# Patient Record
Sex: FEMALE | Race: WHITE | NOT HISPANIC OR LATINO | Employment: UNEMPLOYED | URBAN - METROPOLITAN AREA
[De-identification: names, ages, dates, MRNs, and addresses within clinical notes are randomized per-mention and may not be internally consistent; named-entity substitution may affect disease eponyms.]

---

## 2020-03-03 ENCOUNTER — OFFICE VISIT (OUTPATIENT)
Dept: FAMILY MEDICINE CLINIC | Facility: CLINIC | Age: 25
End: 2020-03-03

## 2020-03-03 VITALS
TEMPERATURE: 98 F | BODY MASS INDEX: 23.51 KG/M2 | OXYGEN SATURATION: 98 % | WEIGHT: 146.3 LBS | HEART RATE: 61 BPM | DIASTOLIC BLOOD PRESSURE: 78 MMHG | HEIGHT: 66 IN | SYSTOLIC BLOOD PRESSURE: 114 MMHG

## 2020-03-03 DIAGNOSIS — Z00.00 HEALTH MAINTENANCE EXAMINATION: Primary | ICD-10-CM

## 2020-03-03 PROBLEM — F41.1 GAD (GENERALIZED ANXIETY DISORDER): Status: ACTIVE | Noted: 2020-03-03

## 2020-03-03 PROBLEM — E55.9 HYPOVITAMINOSIS D: Status: ACTIVE | Noted: 2020-03-03

## 2020-03-03 PROCEDURE — 99385 PREV VISIT NEW AGE 18-39: CPT | Performed by: FAMILY MEDICINE

## 2020-03-03 NOTE — PROGRESS NOTES
Marie Sheridan is a 24 y.o. female.     Chief Complaint   Patient presents with   • Establish Care     new pt establishing today with dr venegas   • Anxiety     pt has history anxietry and depression is fine right now but would like to make sure dr venegas knows       HPI     Pt is a pleasant 24 y.o. YO female here for Annual exam.  Also with hx of anxiety that started in college about 2012.  She thinks it was very circumstantial with her father living her mom.  Had tried medication for 1 year with minimal help with anxiety and not with depression, went to a psychiatrist that was not helpful.  She was on Paxil and Lithium at that time - she was having extreme panic attacks. No SI/HI.    Health Maintenance   Topic Date Due   • ANNUAL PHYSICAL  06/16/1997   • HPV VACCINES (1 - Female 2-dose series) 06/16/2005   • PAP SMEAR  03/03/2020   • INFLUENZA VACCINE  08/01/2020   • TDAP/TD VACCINES (2 - Td) 03/12/2029   • HEPATITIS C SCREENING  Completed       Diet: exercising 3 times a week, trim healthy mama diet     GYN: UTD  Immunizations: UTD      The following portions of the patient's history were reviewed and updated as appropriate: allergies, current medications, past family history, past medical history, past social history, past surgical history and problem list.    Review of Systems   Constitutional: Negative.  Negative for fatigue.   HENT: Negative.    Eyes: Negative.  Negative for discharge and itching.   Respiratory: Negative.  Negative for apnea, choking and chest tightness.    Cardiovascular: Negative for chest pain, palpitations and leg swelling.   Gastrointestinal: Negative.  Negative for abdominal distention, abdominal pain and anal bleeding.   Endocrine: Negative.  Negative for cold intolerance and heat intolerance.   Genitourinary: Negative.  Negative for difficulty urinating, dyspareunia and dysuria.   Musculoskeletal: Negative.  Negative for arthralgias, back pain, gait problem, joint swelling and myalgias.    Skin: Negative.  Negative for color change and pallor.   Allergic/Immunologic: Negative for environmental allergies, food allergies and immunocompromised state.   Neurological: Positive for dizziness. Negative for facial asymmetry.   Hematological: Negative.    Psychiatric/Behavioral: Negative.  Negative for suicidal ideas.       Objective  Vitals:    03/03/20 1053   BP: 114/78   Pulse: 61   Temp: 98 °F (36.7 °C)   SpO2: 98%        Physical Exam   Constitutional: She is oriented to person, place, and time. She appears well-developed and well-nourished. No distress.   HENT:   Head: Normocephalic.   Nose: Nose normal.   Eyes: EOM are normal.   Cardiovascular: Normal rate, regular rhythm, normal heart sounds and intact distal pulses.   No murmur heard.  Pulmonary/Chest: Effort normal and breath sounds normal. No respiratory distress.   Musculoskeletal: Normal range of motion.   Neurological: She is alert and oriented to person, place, and time.   Skin: Skin is warm and dry. No rash noted.   Psychiatric: She has a normal mood and affect. Her behavior is normal. Judgment and thought content normal.   Nursing note and vitals reviewed.      No current outpatient medications on file.    Procedures    Lab Results (most recent)     None              Marie was seen today for establish care and anxiety.    Diagnoses and all orders for this visit:    Health maintenance examination      Pleasant 25 YO pt here as a new pt, HM performed today.  TONY well controlled off meds currently. Hx of severe depression on lithium and paxil previously managed by psych - but doing well currently without issues.  Good support system/ healthy lifestyle and proactive. No current symptoms.   UTD.  GYN health UTD.  Counseled patient to heat healthy well balanced meals and exercise regularly.     Return in about 1 year (around 3/3/2021), or if symptoms worsen or fail to improve, for Annual physical.      Hazel Quijano MD

## 2020-04-10 ENCOUNTER — PATIENT MESSAGE (OUTPATIENT)
Dept: FAMILY MEDICINE CLINIC | Facility: CLINIC | Age: 25
End: 2020-04-10

## 2020-04-15 NOTE — TELEPHONE ENCOUNTER
"From: Marie Sheridan  To: Hazel Quijano MD  Sent: 4/10/2020 11:57 AM EDT  Subject: Visit Follow-Up Question    Hazel Tim,    I had a question about my appointment. I was under the impression that my appointment was an annual exam that was preventative. I am being charged for a diagnoses appointment. I believe all we talked about was my medical history and nothing that is current. I thought that was included as being an established patient. I am being charged for something that isn't considered \"preventative\" and I was wondering if you could change that for me since I was just getting established and my annual appointment. Please let me know if I misunderstood my appointment. Thank you so much!    Marie Sheridan  "

## 2020-04-24 ENCOUNTER — E-VISIT (OUTPATIENT)
Dept: FAMILY MEDICINE CLINIC | Facility: CLINIC | Age: 25
End: 2020-04-24

## 2020-04-24 DIAGNOSIS — J06.9 ACUTE URI: Primary | ICD-10-CM

## 2020-04-29 NOTE — PATIENT INSTRUCTIONS
Latasha Ms Sheridan,    It seems like there has been some confusion with the Evisits, so sorry for that.  I have been out of the office and returned today. I do think that your symptoms warrant testing for COVID 19.  If the test is negative it alternatively could be bad allergies or a sinus infection.  If the test is negative I would wait for over a week of symptoms to see if your symptoms improve.  If they do then no need for more treatment.  If they do not improve then I would recommend following up me via a phone call to get some Augmentin.     In terms of testing: The below link has the testing for COVID19 drive through clinic in [facility].  Currently we have one drive through center.  We are hoping to have supplies to test as well but as of now my office does not have swabs to test patients. I would recommend logging in early in the AM to make an apt for a COVID19 test.  I understand they fill up quickly as they are the only center for the [facility].  If you are not able to get a testing date I do not think you will be harmed, but supportive care at home and acting as if you are positive.  We hope to have antibody testing later on to see who has already developed immunity to the virus.  If you get shortness of breath, blue lips or fingers, get high fevers or rapidly worsening please go to the ER.     [email or web address]    Please let me know if you have more questions,    Dr. Quijano

## 2020-04-29 NOTE — PROGRESS NOTES
Marie Sheridan    1995  [id number]    I have reviewed the e-Visit questionnaire and patient's answers, my assessment and plan are as follows:    HPI  Pt with less than one week of cough, chest tightness, low grade temps, sinus pressure that is not improving. No SOA or exertional dyspnea.     Review of Systems - History obtained from chart review and the patient  Allergy and Immunology ROS: positive for - nasal congestion  Negative for SOA or high fever      Diagnoses and all orders for this visit:    Acute URI    start quarantine, instructions for drive through testing given.  Instructions to ER given, FU if not improving.     Any medications prescribed have been sent electronically to   [facility]/pharmacy #[id number] [place], [place] - [address] [place] [address] AT [facility] - [phone]  - [phone] [place] [address]  Phone: [phone] Fax: [phone]        Hazel Quijano MD  04/29/20  4:14 PM

## 2020-04-30 ENCOUNTER — TELEMEDICINE (OUTPATIENT)
Dept: FAMILY MEDICINE CLINIC | Facility: CLINIC | Age: 25
End: 2020-04-30

## 2020-04-30 VITALS — BODY MASS INDEX: 22.18 KG/M2 | WEIGHT: 138 LBS | HEIGHT: 66 IN

## 2020-04-30 DIAGNOSIS — J06.9 ACUTE URI: Primary | ICD-10-CM

## 2020-04-30 PROCEDURE — 99213 OFFICE O/P EST LOW 20 MIN: CPT | Performed by: FAMILY MEDICINE

## 2020-04-30 NOTE — PROGRESS NOTES
Marie Sheridan is a 24 y.o. female.     Chief Complaint   Patient presents with   • possible covid-19     Patient is wanting to discuss symptoms she is having        HPI     Pt is a pleasant 24 y.o. YO female here for URI symptoms - 6 days of cough, congestion, sinus pressure and low grade temps with some chest tightness, feels resolved and now no sx.  However her son is now having temps around 99 and cough with congestion.  she is concerned for her son with hypotonia.  Her symptoms have greatly improved and almost resolved, she is concerned for her sons safety and knowing if they have already been exposed or not.      The following portions of the patient's history were reviewed and updated as appropriate: allergies, current medications, past family history, past medical history, past social history, past surgical history and problem list.    Review of Systems   Constitutional: Positive for appetite change and fever.   HENT: Positive for congestion.    Eyes: Negative for discharge and itching.   Respiratory: Positive for cough and chest tightness.    Cardiovascular: Negative for chest pain and leg swelling.   Gastrointestinal: Negative for abdominal distention and abdominal pain.   Endocrine: Negative for cold intolerance and heat intolerance.   Genitourinary: Negative for difficulty urinating and dyspareunia.   Musculoskeletal: Negative for arthralgias and back pain.   Skin: Negative for color change and pallor.   Allergic/Immunologic: Negative for environmental allergies and food allergies.   Neurological: Positive for headaches.   Hematological: Negative for adenopathy. Does not bruise/bleed easily.   Psychiatric/Behavioral: Negative for agitation and behavioral problems.       Objective  There were no vitals filed for this visit.     Physical Exam   Constitutional: She appears well-developed and well-nourished. No distress.   Pulmonary/Chest: No respiratory distress.   Psychiatric: She has a normal mood and  affect. Her behavior is normal. Judgment and thought content normal.       No current outpatient medications on file.    Procedures    Lab Results (most recent)     None              Marie was seen today for possible covid-19.    Diagnoses and all orders for this visit:    Acute URI        We discussed antibody testing and will plan to have this done when available and reliable. I also advise that she call her sons pediatrician for testing.     Gets labs from [facility] ([place]     Return if symptoms worsen or fail to improve.      Hazel Quijano MD    This was an audio and video enabled telemedicine encounter secondary to CDC recommendations and patient safety with COVID19 Pandemic.

## 2021-01-14 ENCOUNTER — OFFICE VISIT (OUTPATIENT)
Dept: FAMILY MEDICINE CLINIC | Facility: CLINIC | Age: 26
End: 2021-01-14

## 2021-01-14 VITALS
WEIGHT: 138 LBS | HEART RATE: 66 BPM | HEIGHT: 66 IN | TEMPERATURE: 97.8 F | OXYGEN SATURATION: 100 % | DIASTOLIC BLOOD PRESSURE: 78 MMHG | BODY MASS INDEX: 22.18 KG/M2 | SYSTOLIC BLOOD PRESSURE: 108 MMHG

## 2021-01-14 DIAGNOSIS — H60.392 OTHER INFECTIVE ACUTE OTITIS EXTERNA OF LEFT EAR: Primary | ICD-10-CM

## 2021-01-14 PROCEDURE — 99213 OFFICE O/P EST LOW 20 MIN: CPT | Performed by: FAMILY MEDICINE

## 2021-01-14 NOTE — PROGRESS NOTES
"Chief Complaint  Earache (left ear pain for few days  )    Subjective          Marie Sheridan presents to [facility] for   History of Present Illness  L ear pain, mild congestion, dull ache, no hearing loss, no fever.  No swimming.  No drainage from the ear.  No sick contacts or other symptoms  Objective   Vital Signs:   /78   Pulse 66   Temp 97.8 °F (36.6 °C)   Ht 167.6 cm (65.98\")   Wt 62.6 kg (138 lb)   SpO2 100%   BMI 22.29 kg/m²     Physical Exam  Vitals signs and nursing note reviewed.   Constitutional:       General: She is not in acute distress.     Appearance: She is well-developed.   HENT:      Head: Normocephalic.      Comments: Ear exam with cerumen impaction on the right that is mild, left ear with normal TM, bony landmarks visualized and normal.  Some erythema within the ear canal.  No anterior lymphadenopathy or cervical lymphadenopathy.     Nose: Nose normal.   Cardiovascular:      Rate and Rhythm: Normal rate and regular rhythm.      Heart sounds: Normal heart sounds. No murmur.   Pulmonary:      Effort: Pulmonary effort is normal. No respiratory distress.      Breath sounds: Normal breath sounds.   Musculoskeletal: Normal range of motion.   Skin:     General: Skin is warm and dry.      Findings: No rash.   Neurological:      Mental Status: She is alert and oriented to person, place, and time.   Psychiatric:         Behavior: Behavior normal.         Thought Content: Thought content normal.         Judgment: Judgment normal.        Result Review :                 Assessment and Plan    Problem List Items Addressed This Visit     None      Visit Diagnoses     Other infective acute otitis externa of left ear    -  Primary    Relevant Medications    neomycin-polymyxin-hydrocortisone (CORTISPORIN) [id number] otic solution      Pleasant 25-year-old female with acute left ear pain that started yesterday.  No evidence of otitis media but some mild otitis externa. "  Okay to start with OTC topical hydrocortisone and fill eardrops as above.  She will be traveling out of town tomorrow so will take them with her if her symptoms worsen.  If any changes to her hearing she was advised to seek further evaluation.    Follow Up   Return if symptoms worsen or fail to improve.  Patient was given instructions and counseling regarding her condition or for health maintenance advice. Please see specific information pulled into the AVS if appropriate.   Medical assistant and I wore mask and eyewear protection during entire encounter.  Patient wore mask.    Hazel Quijano MD

## 2021-03-05 ENCOUNTER — OFFICE VISIT (OUTPATIENT)
Dept: FAMILY MEDICINE CLINIC | Facility: CLINIC | Age: 26
End: 2021-03-05

## 2021-03-05 VITALS
TEMPERATURE: 97.6 F | OXYGEN SATURATION: 100 % | HEART RATE: 74 BPM | DIASTOLIC BLOOD PRESSURE: 66 MMHG | HEIGHT: 66 IN | BODY MASS INDEX: 21.69 KG/M2 | SYSTOLIC BLOOD PRESSURE: 104 MMHG | WEIGHT: 135 LBS

## 2021-03-05 DIAGNOSIS — R10.13 EPIGASTRIC PAIN: ICD-10-CM

## 2021-03-05 DIAGNOSIS — Z00.00 HEALTH MAINTENANCE EXAMINATION: Primary | ICD-10-CM

## 2021-03-05 LAB
ALBUMIN SERPL-MCNC: 4.8 G/DL (ref 3.5–5.2)
ALBUMIN/GLOB SERPL: 1.8 G/DL
ALP SERPL-CCNC: 59 U/L (ref 39–117)
ALT SERPL-CCNC: 15 U/L (ref 1–33)
AST SERPL-CCNC: 18 U/L (ref 1–32)
BASOPHILS # BLD AUTO: 0.06 10*3/MM3 (ref 0–0.2)
BASOPHILS NFR BLD AUTO: 0.7 % (ref 0–1.5)
BILIRUB SERPL-MCNC: 0.4 MG/DL (ref 0–1.2)
BUN SERPL-MCNC: 14 MG/DL (ref 6–20)
BUN/CREAT SERPL: 20.9 (ref 7–25)
CALCIUM SERPL-MCNC: 9.8 MG/DL (ref 8.6–10.5)
CHLORIDE SERPL-SCNC: 102 MMOL/L (ref 98–107)
CO2 SERPL-SCNC: 23.1 MMOL/L (ref 22–29)
CREAT SERPL-MCNC: 0.67 MG/DL (ref 0.57–1)
EOSINOPHIL # BLD AUTO: 0.39 10*3/MM3 (ref 0–0.4)
EOSINOPHIL NFR BLD AUTO: 4.7 % (ref 0.3–6.2)
ERYTHROCYTE [DISTWIDTH] IN BLOOD BY AUTOMATED COUNT: 11.8 % (ref 12.3–15.4)
GLOBULIN SER CALC-MCNC: 2.6 GM/DL
GLUCOSE SERPL-MCNC: 77 MG/DL (ref 65–99)
HCT VFR BLD AUTO: 38.6 % (ref 34–46.6)
HGB BLD-MCNC: 12.9 G/DL (ref 12–15.9)
IMM GRANULOCYTES # BLD AUTO: 0.02 10*3/MM3 (ref 0–0.05)
IMM GRANULOCYTES NFR BLD AUTO: 0.2 % (ref 0–0.5)
LIPASE SERPL-CCNC: 49 U/L (ref 13–60)
LYMPHOCYTES # BLD AUTO: 2.33 10*3/MM3 (ref 0.7–3.1)
LYMPHOCYTES NFR BLD AUTO: 28.1 % (ref 19.6–45.3)
MCH RBC QN AUTO: 29.7 PG (ref 26.6–33)
MCHC RBC AUTO-ENTMCNC: 33.4 G/DL (ref 31.5–35.7)
MCV RBC AUTO: 88.7 FL (ref 79–97)
MONOCYTES # BLD AUTO: 0.61 10*3/MM3 (ref 0.1–0.9)
MONOCYTES NFR BLD AUTO: 7.4 % (ref 5–12)
NEUTROPHILS # BLD AUTO: 4.87 10*3/MM3 (ref 1.7–7)
NEUTROPHILS NFR BLD AUTO: 58.9 % (ref 42.7–76)
NRBC BLD AUTO-RTO: 0 /100 WBC (ref 0–0.2)
PLATELET # BLD AUTO: 320 10*3/MM3 (ref 140–450)
POTASSIUM SERPL-SCNC: 4.5 MMOL/L (ref 3.5–5.2)
PROT SERPL-MCNC: 7.4 G/DL (ref 6–8.5)
RBC # BLD AUTO: 4.35 10*6/MM3 (ref 3.77–5.28)
SODIUM SERPL-SCNC: 136 MMOL/L (ref 136–145)
WBC # BLD AUTO: 8.28 10*3/MM3 (ref 3.4–10.8)

## 2021-03-05 PROCEDURE — 99395 PREV VISIT EST AGE 18-39: CPT | Performed by: FAMILY MEDICINE

## 2021-03-05 RX ORDER — FAMOTIDINE 40 MG/1
40 TABLET, FILM COATED ORAL NIGHTLY PRN
Qty: 30 TABLET | Refills: 2 | Status: SHIPPED | OUTPATIENT
Start: 2021-03-05 | End: 2021-04-29

## 2021-03-05 NOTE — PROGRESS NOTES
"Chief Complaint  Annual Exam (no complain) and GI Problem (pt said will like to talk about some gi issues having lately )    Subjective          Marie Sheridan presents to [facility]  History of Present Illness  Annual Exam.    Health Maintenance   Topic Date Due   • HPV VACCINES (1 - 2-dose series) 06/16/2005   • INFLUENZA VACCINE  03/05/2022 (Originally 8/1/2020)   • ANNUAL PHYSICAL  03/06/2022   • PAP SMEAR  08/01/2023   • TDAP/TD VACCINES (2 - Td) 03/12/2029   • HEPATITIS C SCREENING  Completed   • Pneumococcal Vaccine 0-64  Aged Out   • MENINGOCOCCAL VACCINE  Aged Out       Diet: having some digestive issues, her stress is elevated.   Exercise: runs  GYN: UTD  Immunizations: declined flu   Colon cancer screening: neg for sx  Sleep/mental health: good.     Having diarrhea most of her life, neg eval for celiacs in the past. Seemed to get better and then worst 2 yers ago - and now anxiety is worst.  Now lots of nausea for months, sometimes she feels like her blood sugar is low.  She has a lot of food aversions, some upper right abdominal pain. RUQ, mostly after she eats. She has gone off gluten in her diet. No belching, some bloating, no foul smelling stools.  Does feel worst with     Objective   Vital Signs:   /66   Pulse 74   Temp 97.6 °F (36.4 °C)   Ht 167.6 cm (65.98\")   Wt 61.2 kg (135 lb)   SpO2 100%   BMI 21.80 kg/m²     Physical Exam  Vitals signs and nursing note reviewed.   Constitutional:       General: She is not in acute distress.     Appearance: She is well-developed.   HENT:      Head: Normocephalic.      Nose: Nose normal.   Cardiovascular:      Rate and Rhythm: Normal rate and regular rhythm.      Heart sounds: Normal heart sounds. No murmur.   Pulmonary:      Effort: Pulmonary effort is normal. No respiratory distress.      Breath sounds: Normal breath sounds.   Musculoskeletal: Normal range of motion.   Skin:     General: Skin is warm and dry.      " Findings: No rash.   Neurological:      Mental Status: She is alert and oriented to person, place, and time.   Psychiatric:         Behavior: Behavior normal.         Thought Content: Thought content normal.         Judgment: Judgment normal.        Result Review :                 Assessment and Plan    Diagnoses and all orders for this visit:    1. Health maintenance examination (Primary)    2. Epigastric pain  -     Comprehensive Metabolic Panel  -     Lipase  -     CBC & Differential  -     famotidine (Pepcid) 40 MG tablet; Take 1 tablet by mouth At Night As Needed for Heartburn.  Dispense: 30 tablet; Refill: 2    Patient with annual exam and some digestive issues.  Likely GERD versus gallbladder versus herbal bowel syndrome.  Start with GERD diet and Pepcid, labs as above.  Follow-up in 2 months.  Counseled patient to start weightbearing exercises, up-to-date with GYN, immunizations up-to-date, no cardiac symptoms.  Otherwise doing well.      Follow Up   Return in about 2 months (around 5/5/2021), or if symptoms worsen or fail to improve, for Recheck abdominal pain .  Patient was given instructions and counseling regarding her condition or for health maintenance advice. Please see specific information pulled into the AVS if appropriate.

## 2021-03-16 NOTE — PROGRESS NOTES
Please call patient with results. Kidney and liver functions look normal. Diabetes screen is negative. Cholesterol is normal.  Pancreas enzyme, lipase, is normal as well.  Blood counts look great.  If you are having ongoing issues, please follow-up for further evaluation.  Otherwise we can repeat these next year.     Thank You,    Hazel Quijano M.D.

## 2021-03-31 ENCOUNTER — TELEPHONE (OUTPATIENT)
Dept: FAMILY MEDICINE CLINIC | Facility: CLINIC | Age: 26
End: 2021-03-31

## 2021-03-31 DIAGNOSIS — R19.7 DIARRHEA, UNSPECIFIED TYPE: Primary | ICD-10-CM

## 2021-03-31 DIAGNOSIS — R10.13 EPIGASTRIC PAIN: ICD-10-CM

## 2021-04-01 RX ORDER — DIPHENOXYLATE HYDROCHLORIDE AND ATROPINE SULFATE 2.5; .025 MG/1; MG/1
1 TABLET ORAL 4 TIMES DAILY PRN
Qty: 30 TABLET | Refills: 2 | Status: SHIPPED | OUTPATIENT
Start: 2021-04-01 | End: 2021-04-29

## 2021-04-01 NOTE — TELEPHONE ENCOUNTER
Thank you for making me aware, as these issues have been going on for about 3 months now, I will refer her to gastroenterology.  I would recommend staying well-hydrated with diarrhea.  I will also prescribe some Lomotil to help control the symptoms until we can better understand what the causes.

## 2021-04-16 ENCOUNTER — BULK ORDERING (OUTPATIENT)
Dept: CASE MANAGEMENT | Facility: OTHER | Age: 26
End: 2021-04-16

## 2021-04-16 DIAGNOSIS — Z23 IMMUNIZATION DUE: ICD-10-CM

## 2021-04-29 ENCOUNTER — OFFICE VISIT (OUTPATIENT)
Dept: FAMILY MEDICINE CLINIC | Facility: CLINIC | Age: 26
End: 2021-04-29

## 2021-04-29 VITALS
HEIGHT: 66 IN | HEART RATE: 77 BPM | WEIGHT: 144.4 LBS | OXYGEN SATURATION: 100 % | BODY MASS INDEX: 23.21 KG/M2 | TEMPERATURE: 97.9 F | DIASTOLIC BLOOD PRESSURE: 60 MMHG | SYSTOLIC BLOOD PRESSURE: 102 MMHG

## 2021-04-29 DIAGNOSIS — K90.41 ADULT FORM OF GLUTEN-SENSITIVE ENTEROPATHY: ICD-10-CM

## 2021-04-29 DIAGNOSIS — R19.7 DIARRHEA, UNSPECIFIED TYPE: Primary | ICD-10-CM

## 2021-04-29 PROCEDURE — 99213 OFFICE O/P EST LOW 20 MIN: CPT | Performed by: FAMILY MEDICINE

## 2021-04-29 NOTE — PROGRESS NOTES
"Chief Complaint  GI Problem (pt has made changes in diet and the diarrhea and blating issues are better would like advise )    Subjective          Marie Sheridan presents to [facility]  History of Present Illness  Patient with history of chronic diarrhea, it has waxed and waned, worsened about 2 years ago. Nausea for months, right upper quadrant of abdominal pain especially after eating.  It has improved substantially with coffee and gluten.  Had improvement - it was having diarrhea daily and now not even once a week.  Had some sucle cramps I her legs, no rashes.     Objective   Vital Signs:   /60   Pulse 77   Temp 97.9 °F (36.6 °C)   Ht 167.6 cm (65.98\")   Wt 65.5 kg (144 lb 6.4 oz)   SpO2 100%   BMI 23.32 kg/m²     Physical Exam  Vitals and nursing note reviewed.   Constitutional:       General: She is not in acute distress.     Appearance: She is well-developed.   HENT:      Head: Normocephalic.      Nose: Nose normal.   Eyes:      General: No scleral icterus.  Pulmonary:      Effort: Pulmonary effort is normal. No respiratory distress.   Musculoskeletal:         General: Normal range of motion.   Skin:     General: Skin is warm and dry.      Findings: No rash.   Neurological:      Mental Status: She is alert and oriented to person, place, and time.   Psychiatric:         Behavior: Behavior normal.         Thought Content: Thought content normal.         Judgment: Judgment normal.        Result Review :                 Assessment and Plan    Diagnoses and all orders for this visit:    1. Diarrhea, unspecified type (Primary)    2. Adult form of gluten-sensitive enteropathy  -     Celiac Comprehensive Panel    Patient with chronic diarrhea, occasional abdominal pain, has had negative celiac testing in the past but she has noticed substantial improvement in her symptoms with removing coffee and gluten.  Abdominal pain has resolved, diarrheal episodes of decreased to less than " 1/week.  As her symptoms are improving okay not to follow-up with GI right now.  We'll get celiac panel above to retest.  However I do suspect IBS diarrhea predominance with improvement with low FODMAP diet, specifically gluten.  Handouts given from [facility] and [facility] for information regarding low FODMAP diet.      Follow Up   Return if symptoms worsen or fail to improve.  Patient was given instructions and counseling regarding her condition or for health maintenance advice. Please see specific information pulled into the AVS if appropriate. Medical assistant and I wore mask and eyewear protection during entire encounter.  Patient  wore mask.    Hazel Quijano MD

## 2021-04-30 LAB
ENDOMYSIUM IGA SER QL: NEGATIVE
GLIADIN PEPTIDE IGA SER-ACNC: 3 UNITS (ref 0–19)
GLIADIN PEPTIDE IGG SER-ACNC: 2 UNITS (ref 0–19)
IGA SERPL-MCNC: 121 MG/DL (ref 87–352)
TTG IGA SER-ACNC: <2 U/ML (ref 0–3)
TTG IGG SER-ACNC: 2 U/ML (ref 0–5)

## 2022-05-13 ENCOUNTER — OFFICE VISIT (OUTPATIENT)
Dept: FAMILY MEDICINE CLINIC | Facility: CLINIC | Age: 27
End: 2022-05-13

## 2022-05-13 VITALS
TEMPERATURE: 97.8 F | DIASTOLIC BLOOD PRESSURE: 66 MMHG | BODY MASS INDEX: 23.39 KG/M2 | OXYGEN SATURATION: 99 % | HEART RATE: 76 BPM | HEIGHT: 67 IN | WEIGHT: 149 LBS | SYSTOLIC BLOOD PRESSURE: 110 MMHG

## 2022-05-13 DIAGNOSIS — R10.31 RIGHT LOWER QUADRANT ABDOMINAL PAIN: ICD-10-CM

## 2022-05-13 DIAGNOSIS — Z00.00 HEALTH MAINTENANCE EXAMINATION: Primary | ICD-10-CM

## 2022-05-13 DIAGNOSIS — D50.8 IRON DEFICIENCY ANEMIA SECONDARY TO INADEQUATE DIETARY IRON INTAKE: ICD-10-CM

## 2022-05-13 DIAGNOSIS — K35.30 ACUTE APPENDICITIS WITH LOCALIZED PERITONITIS, WITHOUT PERFORATION, ABSCESS, OR GANGRENE: ICD-10-CM

## 2022-05-13 PROCEDURE — 99395 PREV VISIT EST AGE 18-39: CPT | Performed by: FAMILY MEDICINE

## 2022-05-13 RX ORDER — BACITRACIN 500 [USP'U]/G
1 OINTMENT OPHTHALMIC EVERY 4 HOURS
COMMUNITY

## 2022-05-13 RX ORDER — MELATONIN
1000 DAILY
COMMUNITY

## 2022-05-13 RX ORDER — PRENATAL VIT NO.126/IRON/FOLIC 28MG-0.8MG
TABLET ORAL DAILY
COMMUNITY

## 2022-05-13 NOTE — PROGRESS NOTES
"Chief Complaint  Annual Exam (No complains doing well )    Subjective          Marie Sheridan presents to [facility]  History of Present Illness  Annual Exam.    Health Maintenance   Topic Date Due   • COVID-19 Vaccine (3 - Booster for Pfizer series) 02/19/2022   • ANNUAL PHYSICAL  03/06/2022   • INFLUENZA VACCINE  08/01/2022   • PAP SMEAR  08/01/2023   • TDAP/TD VACCINES (4 - Td or Tdap) 01/03/2032   • HEPATITIS C SCREENING  Completed   • Pneumococcal Vaccine 0-64  Aged Out     Diet: eating well   Exercise: started running again   GYN: UTD, Post partum 3 months.   Immunizations: Up-to-date  Colon cancer screening: Not indicated until 45  Sleep/mental health: sleeping, no PPD   Dentist: Up-to-date no concerns    Interval history, she has had interval changes to her life, delivered by spontaneous vaginal delivery February 7, 2022 and then had appendicitis  February 16, 2022.  She does does have some episodic pain in the RLQ, dull pain that comes and goes.  This has been present for months and possibly years.  No nausea vomiting.  Her pain is mild to moderate currently.    She get some pressure in her head when she is standing up, but not when running.  She did have HT at the end of pregnancy.      Derm: rosacea on her face, present before pregnancy but magnified after pregnancy.  Had multiple styes as well. She has been doing well with rosacea.     Objective   Vital Signs:  /66   Pulse 76   Temp 97.8 °F (36.6 °C)   Ht 170.2 cm (67\")   Wt 67.6 kg (149 lb)   SpO2 99%   BMI 23.34 kg/m²     BMI is within normal parameters. No follow-up required.      Physical Exam  Vitals and nursing note reviewed.   Constitutional:       General: She is not in acute distress.     Appearance: She is well-developed.   HENT:      Head: Normocephalic.      Nose: Nose normal.   Cardiovascular:      Rate and Rhythm: Normal rate and regular rhythm.      Heart sounds: Normal heart sounds. No murmur " heard.  Pulmonary:      Effort: Pulmonary effort is normal. No respiratory distress.      Breath sounds: Normal breath sounds.   Abdominal:      General: Abdomen is flat. Bowel sounds are normal. There is no distension.      Palpations: Abdomen is soft.      Tenderness: There is abdominal tenderness. There is no rebound.      Comments: Nonsurgical abdomen but she does have right lower quadrant abdominal pain with palpation.  Not into the pelvis, no mass.   Musculoskeletal:         General: Normal range of motion.   Skin:     General: Skin is warm and dry.      Findings: No rash.   Neurological:      Mental Status: She is alert and oriented to person, place, and time.   Psychiatric:         Behavior: Behavior normal.         Thought Content: Thought content normal.         Judgment: Judgment normal.        Result Review :                 Assessment and Plan    Diagnoses and all orders for this visit:    1. Health maintenance examination (Primary)    2. Iron deficiency anemia secondary to inadequate dietary iron intake  -     CBC & Differential  -     Iron and TIBC  -     Ferritin    3. Acute appendicitis with localized peritonitis, without perforation, abscess, or gangrene  -     CT Abdomen Pelvis With & Without Contrast; Future  -     Ambulatory Referral to General Surgery    4. Right lower quadrant abdominal pain  -     CT Abdomen Pelvis With & Without Contrast; Future  -     Ambulatory Referral to General Surgery    Annual exam today, overall doing well.  She has started exercise and eating healthy, counseled patient to increase water intake to at least 100 ounces a day especially if she is breast-feeding right now.  Also counseled to continue with exercise, she is getting good sleep thankfully as her daughter Mia is sleeping well.    She has had some notable events since her last visit.  First she had her daughter Mia February 7, 2022, and subsequently developed centralized epigastric pain that was severe,  went back to the hospital and was admitted for appendicitis on 2/15/2022.  As she was so close to delivery, medical management chosen.  She has not had her appendix out.  However she has been getting right lower quadrant abdominal pain that has come and gone.  It seems like she has had pain in this area for possibly a couple years.  She does get diarrhea and loose bowels easily.    Plan to get CT scan of the abdomen and follow-up with general surgery.  She would like to wait to do an appendectomy if needed until after breast-feeding if possible.  But she is open to do it sooner if medically necessary.       Follow Up   Return in about 1 year (around 5/13/2023), or if symptoms worsen or fail to improve, for Annual Exam with fasting labs prior.  Patient was given instructions and counseling regarding her condition or for health maintenance advice. Please see specific information pulled into the AVS if appropriate. Medical assistant and I wore mask and eyewear protection during entire encounter.  Patient wore mask.    Hazel Quijano MD

## 2022-05-14 LAB
BASOPHILS # BLD AUTO: 0.1 X10E3/UL (ref 0–0.2)
BASOPHILS NFR BLD AUTO: 1 %
EOSINOPHIL # BLD AUTO: 0.4 X10E3/UL (ref 0–0.4)
EOSINOPHIL NFR BLD AUTO: 6 %
ERYTHROCYTE [DISTWIDTH] IN BLOOD BY AUTOMATED COUNT: 11.6 % (ref 11.7–15.4)
FERRITIN SERPL-MCNC: 25 NG/ML (ref 15–150)
HCT VFR BLD AUTO: 38.5 % (ref 34–46.6)
HGB BLD-MCNC: 12.5 G/DL (ref 11.1–15.9)
IMM GRANULOCYTES # BLD AUTO: 0 X10E3/UL (ref 0–0.1)
IMM GRANULOCYTES NFR BLD AUTO: 0 %
IRON SATN MFR SERPL: 31 % (ref 15–55)
IRON SERPL-MCNC: 107 UG/DL (ref 27–159)
LYMPHOCYTES # BLD AUTO: 2.2 X10E3/UL (ref 0.7–3.1)
LYMPHOCYTES NFR BLD AUTO: 33 %
MCH RBC QN AUTO: 27.8 PG (ref 26.6–33)
MCHC RBC AUTO-ENTMCNC: 32.5 G/DL (ref 31.5–35.7)
MCV RBC AUTO: 86 FL (ref 79–97)
MONOCYTES # BLD AUTO: 0.6 X10E3/UL (ref 0.1–0.9)
MONOCYTES NFR BLD AUTO: 9 %
NEUTROPHILS # BLD AUTO: 3.4 X10E3/UL (ref 1.4–7)
NEUTROPHILS NFR BLD AUTO: 51 %
PLATELET # BLD AUTO: 319 X10E3/UL (ref 150–450)
RBC # BLD AUTO: 4.5 X10E6/UL (ref 3.77–5.28)
TIBC SERPL-MCNC: 350 UG/DL (ref 250–450)
UIBC SERPL-MCNC: 243 UG/DL (ref 131–425)
WBC # BLD AUTO: 6.6 X10E3/UL (ref 3.4–10.8)

## 2022-06-03 ENCOUNTER — HOSPITAL ENCOUNTER (OUTPATIENT)
Dept: CT IMAGING | Facility: HOSPITAL | Age: 27
End: 2022-06-03

## 2022-08-03 ENCOUNTER — HOSPITAL ENCOUNTER (OUTPATIENT)
Dept: CT IMAGING | Facility: HOSPITAL | Age: 27
End: 2022-08-03

## 2023-02-01 ENCOUNTER — TELEPHONE (OUTPATIENT)
Dept: FAMILY MEDICINE CLINIC | Facility: CLINIC | Age: 28
End: 2023-02-01
Payer: COMMERCIAL

## 2023-02-01 DIAGNOSIS — Z11.1 SCREENING-PULMONARY TB: Primary | ICD-10-CM

## 2023-02-01 NOTE — TELEPHONE ENCOUNTER
Caller: Marie Sheridan    Relationship: Self    Best call back number: [phone]    What orders are you requesting (i.e. lab or imaging): TB TEST    In what timeframe would the patient need to come in: ASAP    Additional notes: PATIENT IS REQUESTING A TB TEST FOR MEDICAID    PLEASE CALL TO SCHEDULE WHEN ORDERS ARE IN.

## 2023-02-03 LAB
GAMMA INTERFERON BACKGROUND BLD IA-ACNC: 0.03 IU/ML
M TB IFN-G BLD-IMP: NEGATIVE
M TB IFN-G CD4+ T-CELLS BLD-ACNC: 0.03 IU/ML
M TBIFN-G CD4+ CD8+T-CELLS BLD-ACNC: 0.04 IU/ML
MITOGEN IGNF BLD-ACNC: >10 IU/ML
QUANTIFERON INCUBATION: NORMAL
SERVICE CMNT-IMP: NORMAL

## 2023-02-14 NOTE — PROGRESS NOTES
Please call patient back with results.  The QuantiFERON has resulted as negative for tuberculosis.  Please let us know if you have further questions.     Thank you

## 2023-05-26 ENCOUNTER — OFFICE VISIT (OUTPATIENT)
Dept: FAMILY MEDICINE CLINIC | Facility: CLINIC | Age: 28
End: 2023-05-26
Payer: COMMERCIAL

## 2023-05-26 VITALS
HEIGHT: 67 IN | OXYGEN SATURATION: 99 % | BODY MASS INDEX: 24.33 KG/M2 | TEMPERATURE: 97.7 F | RESPIRATION RATE: 16 BRPM | SYSTOLIC BLOOD PRESSURE: 136 MMHG | WEIGHT: 155 LBS | DIASTOLIC BLOOD PRESSURE: 84 MMHG | HEART RATE: 88 BPM

## 2023-05-26 DIAGNOSIS — Z13.0 SCREENING, ANEMIA, DEFICIENCY, IRON: ICD-10-CM

## 2023-05-26 DIAGNOSIS — Z13.220 SCREENING FOR LIPID DISORDERS: ICD-10-CM

## 2023-05-26 DIAGNOSIS — Z00.00 HEALTH MAINTENANCE EXAMINATION: Primary | ICD-10-CM

## 2023-05-26 DIAGNOSIS — R03.0 ELEVATED BLOOD PRESSURE READING IN OFFICE WITHOUT DIAGNOSIS OF HYPERTENSION: ICD-10-CM

## 2023-05-26 DIAGNOSIS — L71.9 ROSACEA: ICD-10-CM

## 2023-05-26 DIAGNOSIS — Z13.29 SCREENING FOR THYROID DISORDER: ICD-10-CM

## 2023-05-26 DIAGNOSIS — Z13.1 SCREENING FOR DIABETES MELLITUS: ICD-10-CM

## 2023-05-26 RX ORDER — AZELAIC ACID 0.2 G/G
1 CREAM CUTANEOUS 2 TIMES DAILY
Qty: 50 G | Refills: 3 | Status: SHIPPED | OUTPATIENT
Start: 2023-05-26

## 2023-05-26 NOTE — PROGRESS NOTES
Chief Complaint  Annual Exam (Patient not fasting.)    Subjective        Marie Sheridan presents to [facility]IMARY [facility]  History of Present Illness  Annual Exam.    Health Maintenance   Topic Date Due   • COVID-19 Vaccine (3 - Booster for Pfizer series) 11/14/2021   • ANNUAL PHYSICAL  05/13/2023   • INFLUENZA VACCINE  08/01/2023   • PAP SMEAR  08/01/2023   • TDAP/TD VACCINES (4 - Td or Tdap) 01/03/2032   • HEPATITIS C SCREENING  Completed   • Pneumococcal Vaccine 0-64  Aged Out     Diet: diet is OK not super healthy at the moment,    Exercise: excercising more regularly than ever   GYN: UTD - started menses 3 months ago, getting more regular (daughter born 2/2022) she did have one episode of severe pain during ovulation that she thinks with a cyst burst. See her OB in June.   Immunizations: UTD   Colon cancer screening: Not due until 44   Sleep/mental health: doing well -lots of stress with caring for her son with special needs who is 4 and a daughter who is almost 2.  She has a lot on her plate, does feel like she is handling it well but is on edge most of the time.  No SI HI, great support with her [employer] and family.  Dentist:  UTD   Vision: UTD  Derm: had rosacia - it did help and flaired up with her diet-she was prescribed a cream by her dermatologist which did help her symptoms but it has recurred since stopping.  She also has been not watching her diet with seems to make her rosacea worse as well.  Its mostly on her face and nose.    She has had elevated pressure for 1 month, she will get lingering headache after a tylenol wears off and tingling her face, the highest has been 140/90, not lots of stress. Good sleep, 2 cups of caffeine and rare soda. She had gestatational HTN, she is now exercising more often, she does wax and wane with her diet being healthier or not.  She has not had high blood pressure other ways, no family history of significant hypertension concerns.    Objective   Vital  "Signs:  /84   Pulse 88   Temp 97.7 °F (36.5 °C)   Resp 16   Ht 170.2 cm (67.01\")   Wt 70.3 kg (155 lb)   SpO2 99%   BMI 24.27 kg/m²   Estimated body mass index is 24.27 kg/m² as calculated from the following:    Height as of this encounter: 170.2 cm (67.01\").    Weight as of this encounter: 70.3 kg (155 lb).       BMI is within normal parameters. No other follow-up for BMI required.      Physical Exam  Vitals and nursing note reviewed.   Constitutional:       General: She is not in acute distress.     Appearance: Normal appearance. She is well-developed.      Comments: Manual blood pressure rechecked, 138/85.   HENT:      Head: Normocephalic.      Right Ear: Tympanic membrane and ear canal normal. There is no impacted cerumen.      Left Ear: Tympanic membrane and ear canal normal. There is no impacted cerumen.      Nose: Nose normal.   Eyes:      Conjunctiva/sclera: Conjunctivae normal.      Pupils: Pupils are equal, round, and reactive to light.   Neck:      Vascular: No carotid bruit.   Cardiovascular:      Rate and Rhythm: Normal rate and regular rhythm.      Heart sounds: Normal heart sounds. No murmur heard.  Pulmonary:      Effort: Pulmonary effort is normal. No respiratory distress.      Breath sounds: Normal breath sounds.   Abdominal:      General: Abdomen is flat. Bowel sounds are normal. There is no distension.      Tenderness: There is no abdominal tenderness.   Musculoskeletal:         General: Normal range of motion.   Skin:     General: Skin is warm and dry.      Findings: No rash.      Comments: Fine papular erythematous rash on her nose and around her cheeks   Neurological:      Mental Status: She is alert and oriented to person, place, and time.   Psychiatric:         Behavior: Behavior normal.         Thought Content: Thought content normal.         Judgment: Judgment normal.        Result Review :                   Assessment and Plan   Diagnoses and all orders for this visit:    1. " Health maintenance examination (Primary)    2. Screening for diabetes mellitus  -     Comprehensive Metabolic Panel    3. Screening for lipid disorders  -     Lipid Panel    4. Screening, anemia, deficiency, iron  -     CBC & Differential    5. Screening for thyroid disorder  -     TSH Rfx On Abnormal To Free T4    6. Rosacea  -     azelaic acid (Azelex) 20 % cream; Apply 1 application topically to the appropriate area as directed 2 (Two) Times a Day.  Dispense: 50 g; Refill: 3    7. Elevated blood pressure reading in office without diagnosis of hypertension      Here for annual exam, fasting labs ordered as above, immunizations up-to-date, colon cancer screening not due until 44, GYN health up-to-date with GYN, she has restarted menses-1 questionable concern of a ruptured ovarian cyst?  We will follow-up with GYN if continues, cardiovascular screening see below, counseled patient to increase vegetable intake, water and 150 minutes of exercise weekly combining weightbearing exercises and aerobic activity.    Blood pressure elevated today, has been present for 1 month.  Goal blood pressure less than 130/90.  She does have symptoms of headache, plan to limit caffeine, salt, continue with exercise and keeping her heart rate below 180, maintaining good hydration and follow-up in 3 months.  If her blood pressures returned to normal she can cancel the follow-up.      Rosacea not well controlled, I do agree that her symptoms are likely rosacea related, will treat with azelaic acid as she was before.  If her symptoms are continuing to worsen or not improve I do think reevaluation with dermatology is needed.  Her symptoms do not look like a malar rash today.       Follow Up   Return in about 1 year (around 5/26/2024), or if symptoms worsen or fail to improve, for Annual Physical with fasting labs prior, 3 months for BP .  Patient was given instructions and counseling regarding her condition or for health maintenance advice.  Please see specific information pulled into the AVS if appropriate.     Hazel Rubin MD

## 2023-05-27 LAB
ALBUMIN SERPL-MCNC: 4.9 G/DL (ref 3.5–5.2)
ALBUMIN/GLOB SERPL: 2 G/DL
ALP SERPL-CCNC: 83 U/L (ref 39–117)
ALT SERPL-CCNC: 15 U/L (ref 1–33)
AST SERPL-CCNC: 19 U/L (ref 1–32)
BASOPHILS # BLD AUTO: 0.05 10*3/MM3 (ref 0–0.2)
BASOPHILS NFR BLD AUTO: 0.7 % (ref 0–1.5)
BILIRUB SERPL-MCNC: 0.3 MG/DL (ref 0–1.2)
BUN SERPL-MCNC: 9 MG/DL (ref 6–20)
BUN/CREAT SERPL: 12.9 (ref 7–25)
CALCIUM SERPL-MCNC: 9.9 MG/DL (ref 8.6–10.5)
CHLORIDE SERPL-SCNC: 108 MMOL/L (ref 98–107)
CHOLEST SERPL-MCNC: 143 MG/DL (ref 0–200)
CO2 SERPL-SCNC: 22.7 MMOL/L (ref 22–29)
CREAT SERPL-MCNC: 0.7 MG/DL (ref 0.57–1)
EGFRCR SERPLBLD CKD-EPI 2021: 121 ML/MIN/1.73
EOSINOPHIL # BLD AUTO: 0.26 10*3/MM3 (ref 0–0.4)
EOSINOPHIL NFR BLD AUTO: 3.5 % (ref 0.3–6.2)
ERYTHROCYTE [DISTWIDTH] IN BLOOD BY AUTOMATED COUNT: 11.4 % (ref 12.3–15.4)
GLOBULIN SER CALC-MCNC: 2.5 GM/DL
GLUCOSE SERPL-MCNC: 86 MG/DL (ref 65–99)
HCT VFR BLD AUTO: 39.8 % (ref 34–46.6)
HDLC SERPL-MCNC: 46 MG/DL (ref 40–60)
HGB BLD-MCNC: 13.1 G/DL (ref 12–15.9)
IMM GRANULOCYTES # BLD AUTO: 0.03 10*3/MM3 (ref 0–0.05)
IMM GRANULOCYTES NFR BLD AUTO: 0.4 % (ref 0–0.5)
LDLC SERPL CALC-MCNC: 80 MG/DL (ref 0–100)
LYMPHOCYTES # BLD AUTO: 2.29 10*3/MM3 (ref 0.7–3.1)
LYMPHOCYTES NFR BLD AUTO: 31.1 % (ref 19.6–45.3)
MCH RBC QN AUTO: 27.8 PG (ref 26.6–33)
MCHC RBC AUTO-ENTMCNC: 32.9 G/DL (ref 31.5–35.7)
MCV RBC AUTO: 84.3 FL (ref 79–97)
MONOCYTES # BLD AUTO: 0.5 10*3/MM3 (ref 0.1–0.9)
MONOCYTES NFR BLD AUTO: 6.8 % (ref 5–12)
NEUTROPHILS # BLD AUTO: 4.23 10*3/MM3 (ref 1.7–7)
NEUTROPHILS NFR BLD AUTO: 57.5 % (ref 42.7–76)
NRBC BLD AUTO-RTO: 0 /100 WBC (ref 0–0.2)
PLATELET # BLD AUTO: 354 10*3/MM3 (ref 140–450)
POTASSIUM SERPL-SCNC: 4.2 MMOL/L (ref 3.5–5.2)
PROT SERPL-MCNC: 7.4 G/DL (ref 6–8.5)
RBC # BLD AUTO: 4.72 10*6/MM3 (ref 3.77–5.28)
SODIUM SERPL-SCNC: 141 MMOL/L (ref 136–145)
TRIGL SERPL-MCNC: 89 MG/DL (ref 0–150)
TSH SERPL DL<=0.005 MIU/L-ACNC: 2.93 UIU/ML (ref 0.27–4.2)
VLDLC SERPL CALC-MCNC: 17 MG/DL (ref 5–40)
WBC # BLD AUTO: 7.36 10*3/MM3 (ref 3.4–10.8)

## 2023-06-07 ENCOUNTER — TRANSCRIBE ORDERS (OUTPATIENT)
Dept: ADMINISTRATIVE | Facility: HOSPITAL | Age: 28
End: 2023-06-07
Payer: COMMERCIAL

## 2023-06-07 DIAGNOSIS — R19.00 ABDOMINAL MASS, UNSPECIFIED ABDOMINAL LOCATION: Primary | ICD-10-CM

## 2023-12-08 ENCOUNTER — OFFICE VISIT (OUTPATIENT)
Dept: FAMILY MEDICINE CLINIC | Facility: CLINIC | Age: 28
End: 2023-12-08
Payer: COMMERCIAL

## 2023-12-08 VITALS
WEIGHT: 149 LBS | TEMPERATURE: 98 F | OXYGEN SATURATION: 99 % | BODY MASS INDEX: 23.39 KG/M2 | HEART RATE: 94 BPM | DIASTOLIC BLOOD PRESSURE: 74 MMHG | SYSTOLIC BLOOD PRESSURE: 128 MMHG | HEIGHT: 67 IN

## 2023-12-08 DIAGNOSIS — D72.829 LEUKOCYTOSIS, UNSPECIFIED TYPE: ICD-10-CM

## 2023-12-08 DIAGNOSIS — F41.9 ANXIETY: Primary | ICD-10-CM

## 2023-12-08 DIAGNOSIS — R06.02 SHORTNESS OF BREATH: ICD-10-CM

## 2023-12-08 DIAGNOSIS — N30.00 ACUTE CYSTITIS WITHOUT HEMATURIA: ICD-10-CM

## 2023-12-08 LAB
BILIRUB BLD-MCNC: NEGATIVE MG/DL
CLARITY, POC: CLEAR
COLOR UR: YELLOW
EXPIRATION DATE: ABNORMAL
EXPIRATION DATE: NORMAL
EXPIRATION DATE: NORMAL
FLUAV RNA RESP QL NAA+PROBE: NEGATIVE
FLUBV RNA RESP QL NAA+PROBE: NEGATIVE
GLUCOSE UR STRIP-MCNC: NEGATIVE MG/DL
INTERNAL CONTROL: NORMAL
INTERNAL CONTROL: NORMAL
KETONES UR QL: NEGATIVE
LEUKOCYTE EST, POC: ABNORMAL
Lab: ABNORMAL
Lab: NORMAL
Lab: NORMAL
NITRITE UR-MCNC: NEGATIVE MG/ML
PH UR: 7.5 [PH] (ref 5–8)
PROT UR STRIP-MCNC: NEGATIVE MG/DL
RBC # UR STRIP: ABNORMAL /UL
SARS-COV-2 AG UPPER RESP QL IA.RAPID: NOT DETECTED
SP GR UR: 1.01 (ref 1–1.03)
UROBILINOGEN UR QL: NORMAL

## 2023-12-08 PROCEDURE — 81003 URINALYSIS AUTO W/O SCOPE: CPT | Performed by: NURSE PRACTITIONER

## 2023-12-08 PROCEDURE — 99214 OFFICE O/P EST MOD 30 MIN: CPT | Performed by: NURSE PRACTITIONER

## 2023-12-08 PROCEDURE — 87426 SARSCOV CORONAVIRUS AG IA: CPT | Performed by: NURSE PRACTITIONER

## 2023-12-08 PROCEDURE — 87502 INFLUENZA DNA AMP PROBE: CPT | Performed by: NURSE PRACTITIONER

## 2023-12-08 RX ORDER — PROPRANOLOL HYDROCHLORIDE 10 MG/1
10 TABLET ORAL 2 TIMES DAILY PRN
Qty: 30 TABLET | Refills: 1 | Status: SHIPPED | OUTPATIENT
Start: 2023-12-08 | End: 2023-12-18 | Stop reason: SDUPTHER

## 2023-12-08 RX ORDER — NITROFURANTOIN 25; 75 MG/1; MG/1
100 CAPSULE ORAL 2 TIMES DAILY
Qty: 14 CAPSULE | Refills: 0 | OUTPATIENT
Start: 2023-12-08 | End: 2023-12-11

## 2023-12-08 NOTE — PROGRESS NOTES
"Chief Complaint  Anxiety (ED 11/7/23 left wo being seen. Pt reports feeling weakness and chest pain x1W /Pt believes it's more related to anxiety. TONY 11 PHQ 12)    Subjective        Marie Sheridan presents to [facility]  History of Present Illness  Marie Sheridan is a 28 y.o. female who presents to the clinic today with concerns of anxiety, weakness, and chest pain. Her symptoms of weakness and \"heavy limbs\" started one week ago. She started to have chest pain, uncontrollable anxiety, and hypertension and went to the emergency room 12/7/23. She had lab work and EKG performed. The EKG was normal and her white blood cell count was elevated. Patient went to [place] ED on 12/7/2023 with complaints of chest tightness and worsening anxiety.  Troponin, hCG, PT/INR, PTT, CBC, CMP, pro BNP, magnesium were performed.  CBC did indicate elevated white blood cell count.  This x-ray was performed that was unremarkable and showed no acute disease.  EKG was performed on arrival and 2 hours after.  EKG 2 hours after showed normal sinus rhythm. Patient left without being seen. She denies cough, fever, or chills. She denies personal or family history of blood clots or clotting disorders. She denies long trips or flights recently. She is not on hormone replacement and she does not smoke. She does have some discomfort in her low back     She reports she has always had baseline anxiety and depression, but it has been manageable the last 6 years. She has been under more stress here recently and wonders if this has triggered her symptoms. She will talk to others when she gets anxious and she has been in counseling.     On review of patient's chart from March 2020, she established care with Dr. Rubin and also discussed anxiety.  Based on that note, she had a history of anxiety that started in college, about 2012.  At that time, her anxiety was thought to be circumstantial with her father.  She had reportedly been " "on medication for 1 year with minimal help with anxiety and not with depression.  She went to a psychiatrist that was not helpful as well.  Patient was on Paxil and lithium at that time. She is not currently on anxiety or depression medication.       PHQ-9 Depression Screening  Little interest or pleasure in doing things? 2-->more than half the days   Feeling down, depressed, or hopeless? 1-->several days   Trouble falling or staying asleep, or sleeping too much? 0-->not at all   Feeling tired or having little energy? 2-->more than half the days   Poor appetite or overeating? 2-->more than half the days   Feeling bad about yourself - or that you are a failure or have let yourself or your family down? 2-->more than half the days   Trouble concentrating on things, such as reading the newspaper or watching television? 2-->more than half the days   Moving or speaking so slowly that other people could have noticed? Or the opposite - being so fidgety or restless that you have been moving around a lot more than usual? 0-->not at all   Thoughts that you would be better off dead, or of hurting yourself in some way? 1-->several days   PHQ-9 Total Score 12   If you checked off any problems, how difficult have these problems made it for you to do your work, take care of things at home, or get along with other people? somewhat difficult         Review of Systems   Constitutional:  Negative for chills, fatigue and fever.   Respiratory:  Positive for chest tightness. Negative for shortness of breath and wheezing.    Cardiovascular:  Negative for chest pain and leg swelling.   Psychiatric/Behavioral:  The patient is nervous/anxious.        Objective   Vital Signs:  /74   Pulse 94   Temp 98 °F (36.7 °C)   Ht 170.2 cm (67.01\")   Wt 67.6 kg (149 lb)   SpO2 99%   BMI 23.33 kg/m²   Estimated body mass index is 23.33 kg/m² as calculated from the following:    Height as of this encounter: 170.2 cm (67.01\").    Weight as of " this encounter: 67.6 kg (149 lb).       BMI is within normal parameters. No other follow-up for BMI required.      Physical Exam  Vitals reviewed.   Constitutional:       General: She is not in acute distress.     Appearance: Normal appearance. She is not ill-appearing, toxic-appearing or diaphoretic.   HENT:      Head: Normocephalic and atraumatic.   Eyes:      General: No scleral icterus.        Right eye: No discharge.         Left eye: No discharge.   Cardiovascular:      Rate and Rhythm: Normal rate and regular rhythm.   Pulmonary:      Effort: Pulmonary effort is normal. No respiratory distress.   Neurological:      General: No focal deficit present.      Mental Status: She is alert and oriented to person, place, and time.   Psychiatric:         Attention and Perception: Attention normal.         Mood and Affect: Mood is anxious.         Speech: Speech normal.        Result Review :        Brief Urine Lab Results  (Last result in the past 365 days)        Color   Clarity   Blood   Leuk Est   Nitrite   Protein   CREAT   Urine HCG        12/11/23 1523 Yellow   Slightly Cloudy   Trace   Moderate (2+)   Negative   Negative                POCT Flu A&B, Molecular (12/08/2023 12:43)   POCT Flu A&B, Molecular (12/08/2023 12:43)  POCT SARS-CoV-2 Antigen HECTOR (12/08/2023 12:43)         Assessment and Plan   Diagnoses and all orders for this visit:    1. Anxiety (Primary)  -     Discontinue: propranolol (INDERAL) 10 MG tablet; Take 1 tablet by mouth 2 (Two) Times a Day As Needed (anxiety).  Dispense: 30 tablet; Refill: 1    2. Leukocytosis, unspecified type  -     Cancel: D-dimer, Quantitative  -     POCT urinalysis dipstick, automated    3. Shortness of breath  -     Cancel: D-dimer, Quantitative  -     POCT SARS-CoV-2 Antigen HECTOR  -     POCT Flu A&B, Molecular    4. Acute cystitis without hematuria  -     Discontinue: nitrofurantoin, macrocrystal-monohydrate, (MACROBID) 100 MG capsule; Take 1 capsule by mouth 2 (Two)  Times a Day for 7 days.  Dispense: 14 capsule; Refill: 0  -     POCT urinalysis dipstick, automated  -     Urine Culture - Urine, Urine, Clean Catch          Lab work, EKG, chest x-ray from the ER were reviewed today.  Her white blood cell count was elevated.  Patient does not have a personal or family history of blood clots or clotting disorders and she does not have any predisposing factors like long trips, long flights, smoking, or hormone therapy.  Patient's last pregnancy was 2 years ago.  We can evaluate D-dimer.  If elevated, can proceed with CT scan. COVID-19 and flu testing negative. Urinalysis shows presence of leukocytes, start treatment with antibiotic and send urine culture.  Patient has had her thyroid checked earlier this year in May.  Patient's symptoms could be anxiety related.  She does have a history of anxiety and has been stressed more recently due to the holidays and an upcoming party she is planning. We discussed options for anxiety including starting a daily medication for anxiety or starting an as needed medication. Would recommend against a sedating medication given she is caring for her children throughout the day. We discussed propranolol and this was prescribed. If her anxiety pressits, I would recommend something like sertraline or wellbutrin.        Follow Up   Return in about 2 weeks (around 12/22/2023) for Recheck- anxiety, chest pain.  Patient was given instructions and counseling regarding her condition or for health maintenance advice. Please see specific information pulled into the AVS if appropriate.     Electronically signed by VIN Stevens, 12/19/23, 1:27 PM EST.

## 2023-12-10 ENCOUNTER — PATIENT MESSAGE (OUTPATIENT)
Dept: FAMILY MEDICINE CLINIC | Facility: CLINIC | Age: 28
End: 2023-12-10
Payer: COMMERCIAL

## 2023-12-10 LAB
BACTERIA UR CULT: NORMAL
BACTERIA UR CULT: NORMAL

## 2023-12-11 ENCOUNTER — TELEPHONE (OUTPATIENT)
Dept: FAMILY MEDICINE CLINIC | Facility: CLINIC | Age: 28
End: 2023-12-11
Payer: COMMERCIAL

## 2023-12-11 NOTE — TELEPHONE ENCOUNTER
Spoke with pt she said she forgot to mention she has diarrhea since Tuesday last week  is getting worse was not sure if was because  the UTI but diarrhea was prior  her medications for uti ,she feels is getting worse and not feeling well at all  ,not abdominal pain or any blood in stool juts diarrhea

## 2023-12-11 NOTE — TELEPHONE ENCOUNTER
Caller: Marie Sheridan    Relationship: Self    Best call back number: [phone]    PATIENT CALLED BACK IN TO SEE IF WE'VE BEEN ABLE TO REACH DR BLAKE ON THIS MATTER.    SHE IS TRYING TO DECIDE IF SHE NEEDS TO BE EVALUATED AT AN EMERGENCY ROOM OR NOT.    PLEASE ADVISE

## 2023-12-12 ENCOUNTER — OFFICE VISIT (OUTPATIENT)
Dept: FAMILY MEDICINE CLINIC | Facility: CLINIC | Age: 28
End: 2023-12-12
Payer: COMMERCIAL

## 2023-12-12 VITALS
RESPIRATION RATE: 16 BRPM | HEIGHT: 67 IN | WEIGHT: 148.8 LBS | DIASTOLIC BLOOD PRESSURE: 80 MMHG | TEMPERATURE: 97.7 F | OXYGEN SATURATION: 100 % | HEART RATE: 85 BPM | BODY MASS INDEX: 23.35 KG/M2 | SYSTOLIC BLOOD PRESSURE: 116 MMHG

## 2023-12-12 DIAGNOSIS — R39.9 URINARY SYMPTOM OR SIGN: Primary | ICD-10-CM

## 2023-12-12 DIAGNOSIS — R19.7 DIARRHEA, UNSPECIFIED TYPE: ICD-10-CM

## 2023-12-12 DIAGNOSIS — R00.0 TACHYCARDIA: ICD-10-CM

## 2023-12-12 DIAGNOSIS — F41.9 ANXIETY: ICD-10-CM

## 2023-12-12 PROCEDURE — 99214 OFFICE O/P EST MOD 30 MIN: CPT | Performed by: FAMILY MEDICINE

## 2023-12-12 RX ORDER — SULFAMETHOXAZOLE AND TRIMETHOPRIM 800; 160 MG/1; MG/1
1 TABLET ORAL 2 TIMES DAILY
Qty: 10 TABLET | Refills: 0 | Status: SHIPPED | OUTPATIENT
Start: 2023-12-12 | End: 2023-12-17

## 2023-12-12 NOTE — PROGRESS NOTES
Subjective     Marie Sheridan is a 28 y.o. female.     Chief Complaint   Patient presents with    Diarrhea    Fatigue     Dizziness also.     Rapid Heart Rate     X 7 fdays    Pain     In right hip area and low back. Started today.        History of Present Illness     She is c/o D for 7 days , feeling sick, tired.   1 week ago had D with no blood 1-2 BM /day , feels fatigue   Yesterday had 5-6 x/day  Today had 3 , took imodium   No abd cramps , had N , no V  Feels has tachy, no h/o thyroid dis  Eating normal diet but less.   Her appetite is low in am   She went to the hospital for feeling fatigue on 12/7, her wbc elevated . Then saw PCP,  dx with UTI as her urine showed blood and leukocyte , started on Macrobid , she took it for 3 days as her urine cx was neg.   Then she went to  as she did not fell better , Urine showed some blood and more leukocytes , advised to start on cipro. She did not take it yet.     Recently , started on Inderal for anxiety     Has TONY , she managed it.     Lab Results   Component Value Date    TSH 2.930 05/26/2023         Labs and documentation from HER PCP / other physician has been reviewed          The following portions of the patient's history were reviewed and updated as appropriate: allergies, current medications, past family history, past medical history, past social history, past surgical history, and problem list.        Review of Systems   Gastrointestinal:  Positive for diarrhea and nausea.       Vitals:    12/12/23 1050   BP: 116/80   Pulse: 85   Resp: 16   Temp: 97.7 °F (36.5 °C)   SpO2: 100%           12/12/23  1050   Weight: 67.5 kg (148 lb 12.8 oz)         Body mass index is 23.3 kg/m².      Current Outpatient Medications   Medication Sig Dispense Refill    cholecalciferol (VITAMIN D3) 25 MCG (1000 UT) tablet Take 1 tablet by mouth Daily.      propranolol (INDERAL) 10 MG tablet Take 1 tablet by mouth 2 (Two) Times a Day As Needed (anxiety). 30 tablet 1    ciprofloxacin  (CIPRO) 500 MG tablet Take 1 tablet by mouth 2 (Two) Times a Day for 7 days. 14 tablet 0    sulfamethoxazole-trimethoprim (Bactrim DS) 800-160 MG per tablet Take 1 tablet by mouth 2 (Two) Times a Day for 5 days. 10 tablet 0     No current facility-administered medications for this visit.                Objective   Physical Exam  Vitals and nursing note reviewed.   Constitutional:       General: She is not in acute distress.  Cardiovascular:      Rate and Rhythm: Normal rate and regular rhythm.      Heart sounds: Normal heart sounds. No murmur heard.  Pulmonary:      Effort: Pulmonary effort is normal. No respiratory distress.      Breath sounds: Normal breath sounds. No stridor. No wheezing or rhonchi.   Abdominal:      General: Bowel sounds are normal. There is no distension.      Palpations: Abdomen is soft. There is no mass.      Tenderness: There is no abdominal tenderness. There is no right CVA tenderness, left CVA tenderness, guarding or rebound.      Hernia: No hernia is present.   Skin:     General: Skin is warm.   Neurological:      Mental Status: She is alert and oriented to person, place, and time.   Psychiatric:         Mood and Affect: Mood normal.         Behavior: Behavior normal.         Thought Content: Thought content normal.           Assessment & Plan   Diagnoses and all orders for this visit:    1. Urinary symptom or sign (Primary)  Comments:  as her urine test showed more leukocytes and still has sx, will sgtart on Bactrim  pending urine cx fro UC  Orders:  -     sulfamethoxazole-trimethoprim (Bactrim DS) 800-160 MG per tablet; Take 1 tablet by mouth 2 (Two) Times a Day for 5 days.  Dispense: 10 tablet; Refill: 0    2. Anxiety  Comments:  on Inderla PRN, continue    3. Diarrhea, unspecified type  Comments:  discussed supportive care, keep self well hydrated, ok wih Imodium PRN    4. Tachycardia  Comments:  today HR < 90, close monitoring  could be from dehydra, anxity, othrs        Patient was  given instructions and counseling regarding her condition or for health maintenance advice.   Please see specific information pulled into the AVS if appropriate.       I have fully discussed the nature of the medical condition(s) risks, complications, management, safe and proper use of medications.   Pt stated no allergy to the above prescribed medication.  I have discussed the SIDE EFFECT OF MEDICATION and importance TO report any side effect , the patient expressed good understanding.  Encouraged medication compliance and the importance of keeping scheduled follow up appointments with me and any other providers.    Patient instructed to follow up with our office for results on any labs/imaging ordered during this visit.    Home care discussed  All questions answered  Patient verbalizes understanding and agrees to treatment plan.     Follow up: Return for if no better or worsening symptoms.

## 2023-12-12 NOTE — TELEPHONE ENCOUNTER
As long as she is staying hydrated I think it would be okay not to go to the emergency room.  I am happy to work her in tomorrow if that would be helpful.

## 2023-12-14 ENCOUNTER — TELEPHONE (OUTPATIENT)
Age: 28
End: 2023-12-14
Payer: COMMERCIAL

## 2023-12-18 DIAGNOSIS — F41.9 ANXIETY: ICD-10-CM

## 2023-12-18 RX ORDER — PROPRANOLOL HYDROCHLORIDE 10 MG/1
10 TABLET ORAL 2 TIMES DAILY PRN
Qty: 90 TABLET | Refills: 1 | Status: SHIPPED | OUTPATIENT
Start: 2023-12-18

## 2023-12-18 NOTE — TELEPHONE ENCOUNTER
Rx Refill Note  Requested Prescriptions      No prescriptions requested or ordered in this encounter      Last office visit with prescribing clinician: 5/26/2023   Last telemedicine visit with prescribing clinician: Visit date not found   Next office visit with prescribing clinician: 12/22/2023                         Would you like a call back once the refill request has been completed: [] Yes [] No    If the office needs to give you a call back, can they leave a voicemail: [] Yes [] No    Neida Carson CMA/LMR  12/18/23, 10:52 EST

## 2024-01-05 ENCOUNTER — OFFICE VISIT (OUTPATIENT)
Dept: FAMILY MEDICINE CLINIC | Facility: CLINIC | Age: 29
End: 2024-01-05
Payer: COMMERCIAL

## 2024-01-05 VITALS
SYSTOLIC BLOOD PRESSURE: 122 MMHG | HEIGHT: 67 IN | TEMPERATURE: 97.8 F | DIASTOLIC BLOOD PRESSURE: 70 MMHG | OXYGEN SATURATION: 99 % | WEIGHT: 146 LBS | BODY MASS INDEX: 22.91 KG/M2 | HEART RATE: 90 BPM

## 2024-01-05 DIAGNOSIS — Z11.1 SCREENING EXAMINATION FOR PULMONARY TUBERCULOSIS: ICD-10-CM

## 2024-01-05 DIAGNOSIS — K58.0 IRRITABLE BOWEL SYNDROME WITH DIARRHEA: ICD-10-CM

## 2024-01-05 DIAGNOSIS — F41.1 GAD (GENERALIZED ANXIETY DISORDER): Primary | ICD-10-CM

## 2024-01-05 PROCEDURE — 99214 OFFICE O/P EST MOD 30 MIN: CPT | Performed by: FAMILY MEDICINE

## 2024-01-05 NOTE — PROGRESS NOTES
"Chief Complaint  Anxiety (Would like to talk about another options for anxiety )    Subjective        Marie Sheridan presents to [facility]  History of Present Illness  TONY: chronic problem, however right now is more severe, it is probably been 7 years since she last had an episode of anxiety that was significant, now having panic attacks over the past 6 weeks, some of this was connected to feeling sick and fighting what seems to have been a GI bug.  Thankfully she had improvement with Bactrim.    Her symptoms now are mostly during the day, she felt on edge and anxious, decreased appetite, feeling overwhelmed and then having the physiologic symptoms of a panic attack.    In the past she was placed on Paxil but it made her symptoms worse, so the provider at that time diagnosed her with bipolar and started her on lithium.  However this did not improve her symptoms and she was able to get in with a different provider who was able to take her off this med.  She feels that she was misdiagnosed during this time.    IBS - chronic problems. Since she was a child.     Objective   Vital Signs:  /70   Pulse 90   Temp 97.8 °F (36.6 °C)   Ht 170.2 cm (67\")   Wt 66.2 kg (146 lb)   SpO2 99%   BMI 22.87 kg/m²   Estimated body mass index is 22.87 kg/m² as calculated from the following:    Height as of this encounter: 170.2 cm (67\").    Weight as of this encounter: 66.2 kg (146 lb).       BMI is within normal parameters. No other follow-up for BMI required.      Physical Exam  Vitals and nursing note reviewed.   Constitutional:       General: She is not in acute distress.     Appearance: She is well-developed.   HENT:      Head: Normocephalic.      Nose: Nose normal.   Cardiovascular:      Rate and Rhythm: Normal rate and regular rhythm.      Heart sounds: Normal heart sounds. No murmur heard.  Pulmonary:      Effort: Pulmonary effort is normal. No respiratory distress.      Breath sounds: " Normal breath sounds.   Musculoskeletal:         General: Normal range of motion.   Skin:     General: Skin is warm and dry.      Findings: No rash.   Neurological:      Mental Status: She is alert and oriented to person, place, and time.   Psychiatric:         Behavior: Behavior normal.         Thought Content: Thought content normal.         Judgment: Judgment normal.        Result Review :                   Assessment and Plan   Diagnoses and all orders for this visit:    1. TONY (generalized anxiety disorder) (Primary)  -     sertraline (Zoloft) 50 MG tablet; Take 1 tablet by mouth Daily.  Dispense: 30 tablet; Refill: 3    2. Irritable bowel syndrome with diarrhea    3. Screening examination for pulmonary tuberculosis  -     QuantiFERON TB Gold    Generalized anxiety not well-controlled, will start with sertraline as above, if it does seem to make her symptoms worse then I would wonder if there could be a component of bipolar present.  At this moment it does not appear to be so.  Will follow closely.  Also think that counseling would be beneficial.  Follow-up in 6 weeks, she has had substantial improvement and then she can cancel appointment.    IBS also a chronic problem for years, having 3 or so bowel movements a day.  She will have episodes of significant cramping which is relieved by defecation.  She has not had a colonoscopy but is not sure if she wants to do that for now but would consider and let me know if she would be interested in further evaluation with a gastro specialist.         Follow Up   Return in about 6 weeks (around 2/16/2024), or if symptoms worsen or fail to improve, for Recheck TONY  .  Patient was given instructions and counseling regarding her condition or for health maintenance advice. Please see specific information pulled into the AVS if appropriate.     Hazel Rubin MD

## 2024-01-26 DIAGNOSIS — F41.1 GAD (GENERALIZED ANXIETY DISORDER): ICD-10-CM

## 2024-02-01 ENCOUNTER — LAB (OUTPATIENT)
Facility: HOSPITAL | Age: 29
End: 2024-02-01
Payer: COMMERCIAL

## 2024-02-01 PROCEDURE — 86480 TB TEST CELL IMMUN MEASURE: CPT | Performed by: FAMILY MEDICINE

## 2024-02-03 LAB
GAMMA INTERFERON BACKGROUND BLD IA-ACNC: 0 IU/ML
M TB IFN-G BLD-IMP: NEGATIVE
M TB IFN-G CD4+ T-CELLS BLD-ACNC: 0.02 IU/ML
M TBIFN-G CD4+ CD8+T-CELLS BLD-ACNC: 0.01 IU/ML
MITOGEN IGNF BLD-ACNC: >10 IU/ML
QUANTIFERON INCUBATION: NORMAL
SERVICE CMNT-IMP: NORMAL

## 2024-02-22 ENCOUNTER — OFFICE VISIT (OUTPATIENT)
Dept: FAMILY MEDICINE CLINIC | Facility: CLINIC | Age: 29
End: 2024-02-22
Payer: COMMERCIAL

## 2024-02-22 VITALS
DIASTOLIC BLOOD PRESSURE: 78 MMHG | TEMPERATURE: 97.8 F | HEIGHT: 67 IN | OXYGEN SATURATION: 100 % | WEIGHT: 151 LBS | BODY MASS INDEX: 23.7 KG/M2 | HEART RATE: 88 BPM | SYSTOLIC BLOOD PRESSURE: 142 MMHG

## 2024-02-22 DIAGNOSIS — F32.5 MAJOR DEPRESSIVE DISORDER WITH SINGLE EPISODE, IN FULL REMISSION: ICD-10-CM

## 2024-02-22 DIAGNOSIS — F41.1 GAD (GENERALIZED ANXIETY DISORDER): Primary | ICD-10-CM

## 2024-02-22 PROCEDURE — 99214 OFFICE O/P EST MOD 30 MIN: CPT | Performed by: FAMILY MEDICINE

## 2024-02-22 RX ORDER — LAMOTRIGINE 25 MG/1
25 TABLET ORAL DAILY
Qty: 30 TABLET | Refills: 2 | Status: SHIPPED | OUTPATIENT
Start: 2024-02-22

## 2024-02-22 NOTE — PROGRESS NOTES
"Chief Complaint  Depression (Very depressive  feel sertraline is making everything worse still taking it for the moment  )    Subjective        Marie Sheridan presents to [facility]  History of Present Illness  Pleasant 28-year-old female here for depression which is not well-controlled and acutely worsening.  She started Zoloft which was to help anxiety but instead has worsened her depression, even having thoughts of self-harm, she has acted on this with cutting her legs 2 or 3 days ago.  No thoughts of suicidality or killing herself.  No HI.  She does attribute the worsening of the symptoms to starting the sertraline, she was not having symptoms this severe prior to starting medication although she has had them in years past.    In the past she was started on Paxil but he made her symptoms worse, so her primary care provider at that time diagnosed her with bipolar and started her on lithium.    Objective   Vital Signs:  /78   Pulse 88   Temp 97.8 °F (36.6 °C)   Ht 170.2 cm (67\")   Wt 68.5 kg (151 lb)   SpO2 100%   BMI 23.65 kg/m²   Estimated body mass index is 23.65 kg/m² as calculated from the following:    Height as of this encounter: 170.2 cm (67\").    Weight as of this encounter: 68.5 kg (151 lb).       BMI is within normal parameters. No other follow-up for BMI required.      Physical Exam  Vitals and nursing note reviewed.   Constitutional:       General: She is not in acute distress.     Appearance: She is well-developed.   HENT:      Head: Normocephalic.      Nose: Nose normal.   Eyes:      General: No scleral icterus.  Pulmonary:      Effort: Pulmonary effort is normal. No respiratory distress.   Musculoskeletal:         General: Normal range of motion.   Skin:     General: Skin is warm and dry.      Findings: No rash.   Neurological:      Mental Status: She is alert and oriented to person, place, and time.   Psychiatric:         Mood and Affect: Mood normal.        "  Behavior: Behavior normal.         Thought Content: Thought content normal.         Judgment: Judgment normal.      Comments: Very calm, logical.  Good communicator.        Result Review :                     Assessment and Plan     Diagnoses and all orders for this visit:    1. TONY (generalized anxiety disorder) (Primary)    2. Major depressive disorder with single episode, in full remission  -     lamoTRIgine (LaMICtal) 25 MG tablet; Take 1 tablet by mouth Daily.  Dispense: 30 tablet; Refill: 2    Very pleasant 28-year-old female here for worsening depression and thoughts of self-harm that started after starting sertraline 21 days ago.  She has had similar symptoms in the past when starting Paxil.  Based on her history actually do wonder if there could be a component of bipolar disorder 2, very mild.  I do think getting neuropsych testing to further assess would be helpful to make sure we are treating a right diagnosis.  Handouts given.    Plan:  - Neuropsych testing as above  - Stop sertraline immediately  - As she has been stable off medications I think she will be okay to be off medications for 2 weeks before starting Lamictal 25 mg, warnings given, she has good support at home and through her Jew  - Discussed counseling and different people that may be available to help her work through some of the more immediate needs  - I am happy to talk through any hesitations or questions that her  may have with medication.         Follow Up     Return if symptoms worsen or fail to improve, for Continue follow-up as scheduled.  Patient was given instructions and counseling regarding her condition or for health maintenance advice. Please see specific information pulled into the AVS if appropriate.

## 2024-02-29 ENCOUNTER — PATIENT MESSAGE (OUTPATIENT)
Dept: FAMILY MEDICINE CLINIC | Facility: CLINIC | Age: 29
End: 2024-02-29
Payer: COMMERCIAL

## 2024-02-29 DIAGNOSIS — F41.1 GAD (GENERALIZED ANXIETY DISORDER): Primary | ICD-10-CM

## 2024-02-29 RX ORDER — BUPROPION HYDROCHLORIDE 150 MG/1
150 TABLET ORAL DAILY
Qty: 30 TABLET | Refills: 2 | Status: SHIPPED | OUTPATIENT
Start: 2024-02-29

## 2024-02-29 NOTE — TELEPHONE ENCOUNTER
From: Marie Sheridan  To: Hazel Rubin  Sent: 2/29/2024 1:17 PM EST  Subject: Medication Decision    Hi Hazel,     I wanted to let you know that Jose and I decided that I will take the Wellbutrin. So will you just be sending that prescription over or do I need to call or have an appointment?     My last question is just if I still need to wait till next Thursday to start it or we can start sooner? My anxiety is just so miserable but I’ll do whatever you suggest.     Thanks for everything.     Marie Sheridan

## 2024-03-01 ENCOUNTER — PATIENT MESSAGE (OUTPATIENT)
Dept: FAMILY MEDICINE CLINIC | Facility: CLINIC | Age: 29
End: 2024-03-01
Payer: COMMERCIAL

## 2024-03-01 DIAGNOSIS — F51.01 PRIMARY INSOMNIA: Primary | ICD-10-CM

## 2024-03-01 RX ORDER — QUETIAPINE FUMARATE 25 MG/1
25 TABLET, FILM COATED ORAL NIGHTLY PRN
Qty: 30 TABLET | Refills: 1 | Status: SHIPPED | OUTPATIENT
Start: 2024-03-01

## 2024-03-01 NOTE — TELEPHONE ENCOUNTER
From: Marie Sheridan  To: Hazel Rubin  Sent: 3/1/2024 7:59 AM EST  Subject: Change of Symptoms    Hi Hazel, sorry to message again. I was talking with Carmella and she wanted me to update you. The last week I have had a terrible time sleeping. This has honestly never been a symptom for me. Even at my worst I’m usually a consistent sleeper from like 11-7 each night. Back when I was on Zoloft it disrupted my sleep a touch in the beginning but I was still sleeping ok and then sometimes sleeping during the day. But now I have felt so wired/restless. At first I thought I was just stressed about our mtg on Wednesday but it has continued to be a problem. I have tried to keep all the practical routine stuff and take care of myself and nothing is helping. I’m unable to sleep until after around 1am and I’m only sleeping like an hour at a time before I wake up and am awake again. Then I’m waking up still before my alarm. I try to rest when my kids nap and I just feel totally wired and can’t rest. I feel like I have false energy. Then it dawned on me yesterday that maybe that would be important   for you to know when considering medication. I didn’t know if this was more pointing towards bipolar or not. And I know Wellbutrin can cause more energy/insomnia so that made me nervous. I’m mostly just nervous I can’t keep going without sleep mentally or physically and wanted to reach out for help bc I’m not sure what to do. Sorry this is another change and factor.     Marie Sheridan

## 2024-03-22 ENCOUNTER — PATIENT MESSAGE (OUTPATIENT)
Dept: FAMILY MEDICINE CLINIC | Facility: CLINIC | Age: 29
End: 2024-03-22
Payer: COMMERCIAL

## 2024-03-22 ENCOUNTER — TELEPHONE (OUTPATIENT)
Dept: FAMILY MEDICINE CLINIC | Facility: CLINIC | Age: 29
End: 2024-03-22
Payer: COMMERCIAL

## 2024-03-22 NOTE — TELEPHONE ENCOUNTER
See pt Emeliahart encounter 3/22/24 generated by pt.    Called pt to confirm she is not having suicidal ideations.   Pt informed that if she develops suicidal ideations or thoughts of harming others to proceed to ER. Pt v/u      Pt aware [facility] message will be forwarded to , but we also need to schedule an office visit.     Scheduled 4/11/2024 10:00 AM

## 2024-03-22 NOTE — TELEPHONE ENCOUNTER
From: Marie Sheridan  To: Hazel Rubin  Sent: 3/22/2024 8:38 AM EDT  Subject: Medication    Hi Hazel,    I have been struggling with more prevalent depression this last week or so and there’s part of me that just wants to stop trying medicine all together. I don’t feel totally horrible like I did on the zofran but I do feel like I’m more depressed than when all of this started. I originally just came for anxiety as you know but now I feel I’m both depressed and anxious. And it’s definitely affecting how I function throughout the day. I’m still struggling with thoughts of self harm and stuff and I’m just wondering if maybe I do better off of medication. I wanted to get your input. I don’t know if this would be from the medicine or maybe it’s just because there’s been so much focus on everything I’m going through. But I definitely don’t feel good or like how I’m doing. I felt better two weeks ago compared to where I’m at now. I just have such a hard time knowing what’s just from life/stress and what isn’t normal. But I definitely feel like my physical and mental response to stress right   now is not normal at all. A lot of severe anxiety, bad depression, and thoughts of self harm. And I want to be able to feel more balanced.   Sorry this is all over the place. Hopefully it makes sense.     Marie Sheridan

## 2024-03-22 NOTE — TELEPHONE ENCOUNTER
Called pt to confirm she is not having suicidal ideations.   Pt informed that if she develops suicidal ideations or thoughts of harming others to proceed to ER. Pt v/u     Pt aware [facility] message will be forwarded to , but we also need to schedule an office visit.    Scheduled 4/11/2024 10:00 AM

## 2024-03-26 DIAGNOSIS — F41.1 GAD (GENERALIZED ANXIETY DISORDER): ICD-10-CM

## 2024-03-26 DIAGNOSIS — F51.01 PRIMARY INSOMNIA: ICD-10-CM

## 2024-03-26 RX ORDER — BUPROPION HYDROCHLORIDE 150 MG/1
150 TABLET ORAL DAILY
Qty: 90 TABLET | Refills: 1 | Status: SHIPPED | OUTPATIENT
Start: 2024-03-26

## 2024-03-26 RX ORDER — QUETIAPINE FUMARATE 25 MG/1
25 TABLET, FILM COATED ORAL NIGHTLY PRN
Qty: 90 TABLET | Refills: 1 | Status: SHIPPED | OUTPATIENT
Start: 2024-03-26

## 2024-05-20 ENCOUNTER — TELEPHONE (OUTPATIENT)
Dept: FAMILY MEDICINE CLINIC | Facility: CLINIC | Age: 29
End: 2024-05-20
Payer: COMMERCIAL

## 2024-05-20 NOTE — TELEPHONE ENCOUNTER
Caller: Marie Sheridan    Relationship: Self    Best call back number: [phone]     What is the best time to reach you: ANY    Who are you requesting to speak with (clinical staff, provider,  specific staff member): CLINICAL STAFF    Do you know the name of the person who called:      What was the call regarding: PATIENT CALLED STATED SHE HAS SORE THROAT FOR LAST 8 DAYS, LYMPH NODES ON LEFT SIDE HURTING LAST 3 DAYS, TIRED.  PATIENT WANTS TO KNOW IF IT IS NORMAL TO HAVE THESE SYMPTOMS THIS LONG AND SHOULD SHE COME INTO THE OFFICE.      Is it okay if the provider responds through MyChart:   YES

## 2024-05-21 ENCOUNTER — OFFICE VISIT (OUTPATIENT)
Dept: FAMILY MEDICINE CLINIC | Facility: CLINIC | Age: 29
End: 2024-05-21
Payer: COMMERCIAL

## 2024-05-21 VITALS
OXYGEN SATURATION: 100 % | HEART RATE: 78 BPM | HEIGHT: 67 IN | TEMPERATURE: 97.3 F | WEIGHT: 145 LBS | SYSTOLIC BLOOD PRESSURE: 134 MMHG | BODY MASS INDEX: 22.76 KG/M2 | DIASTOLIC BLOOD PRESSURE: 78 MMHG

## 2024-05-21 DIAGNOSIS — J02.9 SORE THROAT: ICD-10-CM

## 2024-05-21 DIAGNOSIS — B96.89 BACTERIAL SINUSITIS: Primary | ICD-10-CM

## 2024-05-21 DIAGNOSIS — J32.9 BACTERIAL SINUSITIS: Primary | ICD-10-CM

## 2024-05-21 LAB
EXPIRATION DATE: NORMAL
FLUAV AG NPH QL: NEGATIVE
FLUBV AG NPH QL: NEGATIVE
INTERNAL CONTROL: NORMAL
Lab: NORMAL
S PYO AG THROAT QL: NEGATIVE
SARS-COV-2 AG UPPER RESP QL IA.RAPID: NOT DETECTED

## 2024-05-21 PROCEDURE — 99214 OFFICE O/P EST MOD 30 MIN: CPT | Performed by: STUDENT IN AN ORGANIZED HEALTH CARE EDUCATION/TRAINING PROGRAM

## 2024-05-21 PROCEDURE — 87426 SARSCOV CORONAVIRUS AG IA: CPT | Performed by: STUDENT IN AN ORGANIZED HEALTH CARE EDUCATION/TRAINING PROGRAM

## 2024-05-21 PROCEDURE — 87880 STREP A ASSAY W/OPTIC: CPT | Performed by: STUDENT IN AN ORGANIZED HEALTH CARE EDUCATION/TRAINING PROGRAM

## 2024-05-21 PROCEDURE — 87804 INFLUENZA ASSAY W/OPTIC: CPT | Performed by: STUDENT IN AN ORGANIZED HEALTH CARE EDUCATION/TRAINING PROGRAM

## 2024-05-21 RX ORDER — AMOXICILLIN 875 MG/1
875 TABLET, COATED ORAL 2 TIMES DAILY
Qty: 20 TABLET | Refills: 0 | Status: SHIPPED | OUTPATIENT
Start: 2024-05-21 | End: 2024-05-31

## 2024-05-21 NOTE — PROGRESS NOTES
"Chief Complaint  Sore Throat (Pt states she has had these symptoms for roughly nine days and there is still no improvement.) and Nasal Congestion    Subjective        Marie Sheridan presents to [facility]  Sore Throat   This is a new problem. The current episode started in the past 7 days. The problem has been unchanged.   Pain is worse on left side(s). There has been no fever. The pain is at a severity of 4/10. Associated symptoms include congestion, coughing, ear pain, a hoarse voice, neck pain and swollen glands. Pertinent negatives include no abdominal pain, diarrhea, drooling, headaches, shortness of breath, trouble swallowing or vomiting.       Objective   Vital Signs:  /78   Pulse 78   Temp 97.3 °F (36.3 °C)   Ht 170.2 cm (67\")   Wt 65.8 kg (145 lb)   SpO2 100%   BMI 22.71 kg/m²   Estimated body mass index is 22.71 kg/m² as calculated from the following:    Height as of this encounter: 170.2 cm (67\").    Weight as of this encounter: 65.8 kg (145 lb).       BMI is within normal parameters. No other follow-up for BMI required.      Physical Exam  Constitutional:       General: She is not in acute distress.  HENT:      Head: Normocephalic and atraumatic.      Right Ear: Tympanic membrane normal. There is no impacted cerumen.      Left Ear: Tympanic membrane normal. There is no impacted cerumen.      Nose: Congestion and rhinorrhea present.      Mouth/Throat:      Mouth: Mucous membranes are moist.      Pharynx: Posterior oropharyngeal erythema present. No oropharyngeal exudate.   Eyes:      General: No scleral icterus.     Conjunctiva/sclera: Conjunctivae normal.   Cardiovascular:      Rate and Rhythm: Normal rate and regular rhythm.   Pulmonary:      Effort: Pulmonary effort is normal. No respiratory distress.   Skin:     General: Skin is warm and dry.   Neurological:      Mental Status: She is alert and oriented to person, place, and time.   Psychiatric:         Mood and " Affect: Mood normal.         Behavior: Behavior normal.        Result Review :    The following data was reviewed by: Erika Castano MD on 05/21/2024:  Common labs          12/7/2023    17:54   Common Labs   WBC 12.37       Hemoglobin 13.4       Hematocrit 39.5       Platelets 403          Details          This result is from an external source.             Influenza A&B          12/8/2023    12:43   Common Labs   Rapid Influenza A Ag Negative    Rapid Influenza B Ag Negative          Data reviewed : none             Assessment and Plan     There are no diagnoses linked to this encounter.    Symptom onset: 9 days ago  Symptoms include congestion, cough, fatigue, sore throat.  No fevers.  Sick contacts include her 2 and 5yo who were both sick with similar symptoms just prior.   Etiology likely viral in nature given sick contacts.  Could also be allergic.  She was flu and COVID-negative here in the office today. Strep negative.   Counseled patient to use Sudafed for congestion. Can also consider regular Flonase use to further aid congestion. Will go ahead and treat with antibx given history of secondary worsening of symptoms.    She can use Tylenol for muscle aches and sore throat.  Stay well-hydrated and rest.  If she were to develop severe shortness of breath, go to the closest emergency department.           Follow Up     No follow-ups on file.  Patient was given instructions and counseling regarding her condition or for health maintenance advice. Please see specific information pulled into the AVS if appropriate.

## 2024-05-26 DIAGNOSIS — F32.5 MAJOR DEPRESSIVE DISORDER WITH SINGLE EPISODE, IN FULL REMISSION: ICD-10-CM

## 2024-05-28 RX ORDER — LAMOTRIGINE 25 MG/1
25 TABLET ORAL DAILY
Qty: 90 TABLET | OUTPATIENT
Start: 2024-05-28

## 2024-06-14 ENCOUNTER — OFFICE VISIT (OUTPATIENT)
Dept: FAMILY MEDICINE CLINIC | Facility: CLINIC | Age: 29
End: 2024-06-14
Payer: COMMERCIAL

## 2024-06-14 VITALS
WEIGHT: 140 LBS | SYSTOLIC BLOOD PRESSURE: 128 MMHG | HEIGHT: 67 IN | BODY MASS INDEX: 21.97 KG/M2 | DIASTOLIC BLOOD PRESSURE: 80 MMHG | HEART RATE: 83 BPM | TEMPERATURE: 97.8 F | OXYGEN SATURATION: 99 %

## 2024-06-14 DIAGNOSIS — F41.1 GAD (GENERALIZED ANXIETY DISORDER): Primary | ICD-10-CM

## 2024-06-14 DIAGNOSIS — R00.0 TACHYCARDIA: ICD-10-CM

## 2024-06-14 DIAGNOSIS — R53.83 FATIGUE DUE TO DEPRESSION: ICD-10-CM

## 2024-06-14 DIAGNOSIS — F32.A FATIGUE DUE TO DEPRESSION: ICD-10-CM

## 2024-06-14 DIAGNOSIS — F33.0 MILD EPISODE OF RECURRENT MAJOR DEPRESSIVE DISORDER: ICD-10-CM

## 2024-06-14 PROCEDURE — 99214 OFFICE O/P EST MOD 30 MIN: CPT | Performed by: FAMILY MEDICINE

## 2024-06-14 PROCEDURE — 93000 ELECTROCARDIOGRAM COMPLETE: CPT | Performed by: FAMILY MEDICINE

## 2024-06-14 RX ORDER — PROPRANOLOL HYDROCHLORIDE 10 MG/1
10 TABLET ORAL 2 TIMES DAILY PRN
Qty: 90 TABLET | Refills: 1 | Status: SHIPPED | OUTPATIENT
Start: 2024-06-14

## 2024-06-14 NOTE — PROGRESS NOTES
"Chief Complaint  Depression (Not doing to well, would like top talk about it )    Subjective        Marie Sheridan presents to [facility]  History of Present Illness  Pleasant 28-year-old female here to follow-up for depression which is not well-controlled, but thankfully her anxiety has improved significantly.  She started Wellbutrin around April 2024 with initial improvement, sleep that has not improved as well.  Getting 6-8 hours of sleep.  Seroquel does improve her sleep, now using it about 2-4 times per month.    She has noticed that since starting the Wellbutrin that her emotions are little blunted although yesterday she had a lot of tears which was encouraging.  She felt some suicidal thoughts which were isolated to just yesterday.  She was at a conference this past weekend and was extremely anxious at the conference and then since then has had nausea daily with a decreased appetite.  She felt tachycardic yesterday woke up with palpitations and then severe exhaustion the rest of the day.    Objective   Vital Signs:  /80   Pulse 83   Temp 97.8 °F (36.6 °C)   Ht 170 cm (66.93\")   Wt 63.5 kg (140 lb)   SpO2 99%   BMI 21.97 kg/m²   Estimated body mass index is 21.97 kg/m² as calculated from the following:    Height as of this encounter: 170 cm (66.93\").    Weight as of this encounter: 63.5 kg (140 lb).       BMI is within normal parameters. No other follow-up for BMI required.      Physical Exam  Vitals and nursing note reviewed.   Constitutional:       General: She is not in acute distress.     Appearance: She is well-developed.   HENT:      Head: Normocephalic.      Nose: Nose normal.   Eyes:      General: No scleral icterus.  Pulmonary:      Effort: Pulmonary effort is normal. No respiratory distress.   Musculoskeletal:         General: Normal range of motion.   Skin:     General: Skin is warm and dry.      Findings: No rash.   Neurological:      Mental Status: She is " alert and oriented to person, place, and time.   Psychiatric:         Behavior: Behavior normal.         Thought Content: Thought content normal.         Judgment: Judgment normal.        Result Review :          PHQ-9 Depression Screening  Little interest or pleasure in doing things? 1-->several days   Feeling down, depressed, or hopeless? 1-->several days   Trouble falling or staying asleep, or sleeping too much? 1-->several days   Feeling tired or having little energy? 2-->more than half the days   Poor appetite or overeating? 2-->more than half the days   Feeling bad about yourself - or that you are a failure or have let yourself or your family down? 1-->several days   Trouble concentrating on things, such as reading the newspaper or watching television? 2-->more than half the days   Moving or speaking so slowly that other people could have noticed? Or the opposite - being so fidgety or restless that you have been moving around a lot more than usual? 0-->not at all   Thoughts that you would be better off dead, or of hurting yourself in some way? 1-->several days   PHQ-9 Total Score 11   If you checked off any problems, how difficult have these problems made it for you to do your work, take care of things at home, or get along with other people? somewhat difficult           ECG 12 Lead    Date/Time: 6/14/2024 3:52 PM  Performed by: Hazel Rubin MD    Authorized by: Hazel Rubin MD  Rhythm: sinus rhythm  Rate: normal  Conduction: conduction normal  ST Segments: ST segments normal  T Waves: T waves normal  QRS axis: normal  Other: no other findings    Clinical impression: normal ECG            Assessment and Plan     Diagnoses and all orders for this visit:    1. TONY (generalized anxiety disorder) (Primary)  -     propranolol (INDERAL) 10 MG tablet; Take 1 tablet by mouth 2 (Two) Times a Day As Needed (anxiety).  Dispense: 90 tablet; Refill: 1    2. Tachycardia  -     ECG 12 Lead    3. Fatigue due to  depression    4. Mild episode of recurrent major depressive disorder    Very pleasant 28-year-old female here to follow-up for    Generalized anxiety that is improving with Wellbutrin 150 mg daily.  She prefers to stay on the same dose.    Depression not well-controlled although mild, she thinks this is more episodic over the past week.  She would like to stay on Wellbutrin 150 for now, we have an appointment coming up in 10 days and we can reassess at that time.    Tachycardia with episodes of fatigue, I think this is related to episodes of panic and severe anxiety when attending a conference this past weekend.  She has not been eating as well and has had more nausea.  This is more isolated over the past week and previous to that she has been doing much better.  Plan to use propranolol to use as needed for anxiety and then prophylactically in situations where she knows she may be more prone to being anxious.    EKG performed today that was reassuring.  She will let me know what her heart rate is overnight if it is over 100 I think we should get a Holter monitor.    We also discussed possibly considering adding lamotrigine as the Wellbutrin may be flattening her emotions a little bit.  It is questionable.  She will continue to watch for now we will not make changes to the Wellbutrin or add lamotrigine at this time.    She also had labs recently due to of possible migraine.  This was her first episode.  Will wait to start treatments for migraines at this time.  She has not had any issues with headaches since May.     I spent 31 minutes caring for Marie on this date of service. This time includes time spent by me in the following activities:preparing for the visit, reviewing tests, performing a medically appropriate examination and/or evaluation , counseling and educating the patient/family/caregiver, ordering medications, tests, or procedures, referring and communicating with other health care professionals , and  documenting information in the medical record  2minutes spent separately to interpret EKG and record in medical record which is not included in the time above.  Follow Up     Return if symptoms worsen or fail to improve, for Follow-up as previously scheduled.  Patient was given instructions and counseling regarding her condition or for health maintenance advice. Please see specific information pulled into the AVS if appropriate.

## 2024-09-08 DIAGNOSIS — F41.1 GAD (GENERALIZED ANXIETY DISORDER): ICD-10-CM

## 2024-09-09 RX ORDER — PROPRANOLOL HYDROCHLORIDE 10 MG/1
TABLET ORAL
Qty: 180 TABLET | Refills: 1 | Status: SHIPPED | OUTPATIENT
Start: 2024-09-09

## 2024-09-21 DIAGNOSIS — F51.01 PRIMARY INSOMNIA: ICD-10-CM

## 2024-09-21 DIAGNOSIS — F41.1 GAD (GENERALIZED ANXIETY DISORDER): ICD-10-CM

## 2024-09-23 RX ORDER — BUPROPION HYDROCHLORIDE 150 MG/1
150 TABLET ORAL DAILY
Qty: 90 TABLET | Refills: 1 | Status: SHIPPED | OUTPATIENT
Start: 2024-09-23

## 2024-09-23 RX ORDER — QUETIAPINE FUMARATE 25 MG/1
25 TABLET, FILM COATED ORAL NIGHTLY PRN
Qty: 90 TABLET | Refills: 1 | Status: SHIPPED | OUTPATIENT
Start: 2024-09-23

## 2024-10-01 ENCOUNTER — OFFICE VISIT (OUTPATIENT)
Dept: FAMILY MEDICINE CLINIC | Facility: CLINIC | Age: 29
End: 2024-10-01
Payer: COMMERCIAL

## 2024-10-01 VITALS
HEIGHT: 67 IN | SYSTOLIC BLOOD PRESSURE: 114 MMHG | OXYGEN SATURATION: 100 % | HEART RATE: 71 BPM | BODY MASS INDEX: 22.57 KG/M2 | TEMPERATURE: 97.3 F | WEIGHT: 143.8 LBS | DIASTOLIC BLOOD PRESSURE: 70 MMHG

## 2024-10-01 DIAGNOSIS — Z13.220 SCREENING FOR LIPID DISORDERS: ICD-10-CM

## 2024-10-01 DIAGNOSIS — Z13.29 SCREENING FOR THYROID DISORDER: ICD-10-CM

## 2024-10-01 DIAGNOSIS — Z13.0 SCREENING, ANEMIA, DEFICIENCY, IRON: ICD-10-CM

## 2024-10-01 DIAGNOSIS — L70.8 OTHER ACNE: ICD-10-CM

## 2024-10-01 DIAGNOSIS — Z00.00 HEALTH MAINTENANCE EXAMINATION: Primary | ICD-10-CM

## 2024-10-01 DIAGNOSIS — R00.0 TACHYCARDIA: ICD-10-CM

## 2024-10-01 DIAGNOSIS — R00.2 PALPITATIONS: ICD-10-CM

## 2024-10-01 DIAGNOSIS — F41.1 GAD (GENERALIZED ANXIETY DISORDER): ICD-10-CM

## 2024-10-01 PROCEDURE — 99214 OFFICE O/P EST MOD 30 MIN: CPT | Performed by: FAMILY MEDICINE

## 2024-10-01 PROCEDURE — 99395 PREV VISIT EST AGE 18-39: CPT | Performed by: FAMILY MEDICINE

## 2024-10-01 RX ORDER — CLINDAMYCIN PHOSPHATE 10 MG/G
1 GEL TOPICAL 2 TIMES DAILY
Qty: 30 G | Refills: 1 | Status: SHIPPED | OUTPATIENT
Start: 2024-10-01

## 2024-10-01 NOTE — PROGRESS NOTES
"Chief Complaint  Follow-up (Doing well on medication), Anxiety, and Depression annual exam    Subjective        Marie Sheridan presents to [facility]  History of Present Illness  Pleasant 29-year-old female to follow-up for anxiety and depression, currently on Wellbutrin 150 mg daily, Seroquel at night and propranolol as needed for anxiety.  She does feel like her symptoms are much improved but still having episodes of fatigue that will even last a week to the level of not being able to walk around her home or exercise.  She will get some lightheadedness.  Usually these episodes will precipitate with tachycardia that is difficult for her to control, often in the morning not associated with caffeine intake.  she will get a burning in her chest, some blurry viion, usually it is muscle in her legs and high hear trate.     Annual Exam.    Health Maintenance   Topic Date Due    PAP SMEAR  08/01/2023    ANNUAL PHYSICAL  05/26/2024    COVID-19 Vaccine (3 - 2023-24 season) 09/01/2024    INFLUENZA VACCINE  03/31/2025 (Originally 8/1/2024)    TDAP/TD VACCINES (4 - Td or Tdap) 01/03/2032    HEPATITIS C SCREENING  Completed    Pneumococcal Vaccine 0-64  Aged Out       Diet: Getting regular exercise  Exercise: Eating well, doing better with eating protein for breakfast  GYN: Up-to-date her gynecologist has an appointment in November  Immunizations: Declined flu shot today  Colon cancer screening: Not indicated until 45  Sleep/mental health: Doing much better see above  Dentist: Up-to-date  Derm: sonya duongxacibob when pregnant and she has a flakinees and scabbing on the crease of her nose at the cease of her eyes, and there all the time,. Does itch.     Objective   Vital Signs:  /70   Pulse 71   Temp 97.3 °F (36.3 °C)   Ht 170.2 cm (67\")   Wt 65.2 kg (143 lb 12.8 oz)   SpO2 100%   BMI 22.52 kg/m²   Estimated body mass index is 22.52 kg/m² as calculated from the following:    Height as of " "this encounter: 170.2 cm (67\").    Weight as of this encounter: 65.2 kg (143 lb 12.8 oz).    BMI is within normal parameters. No other follow-up for BMI required.      Physical Exam  Vitals and nursing note reviewed.   Constitutional:       General: She is not in acute distress.     Appearance: Normal appearance. She is well-developed.   HENT:      Head: Normocephalic.      Right Ear: Tympanic membrane and ear canal normal. There is no impacted cerumen.      Left Ear: Tympanic membrane and ear canal normal. There is no impacted cerumen.      Nose: Nose normal.   Eyes:      Conjunctiva/sclera: Conjunctivae normal.      Pupils: Pupils are equal, round, and reactive to light.   Neck:      Vascular: No carotid bruit.   Cardiovascular:      Rate and Rhythm: Normal rate and regular rhythm.      Heart sounds: Normal heart sounds. No murmur heard.  Pulmonary:      Effort: Pulmonary effort is normal. No respiratory distress.      Breath sounds: Normal breath sounds.   Abdominal:      General: Abdomen is flat. Bowel sounds are normal. There is no distension.      Tenderness: There is no abdominal tenderness.   Musculoskeletal:         General: Normal range of motion.   Skin:     General: Skin is warm and dry.      Findings: No rash.      Comments: Erythematous papules on the chin, creases of the cheeks and along the outside of the cheek.   Neurological:      Mental Status: She is alert and oriented to person, place, and time.   Psychiatric:         Behavior: Behavior normal.         Thought Content: Thought content normal.         Judgment: Judgment normal.        Result Review :                Assessment and Plan   Diagnoses and all orders for this visit:    1. Health maintenance examination (Primary)    2. TONY (generalized anxiety disorder)  -     Cancel: Vitamin D,25-Hydroxy  -     Vitamin D,25-Hydroxy    3. Tachycardia  -     Holter Monitor - 72 Hour Up To 15 Days; Future  -     Cancel: Vitamin B12  -     Cancel: " Folate  -     Cancel: Magnesium  -     Cancel: Vitamin D,25-Hydroxy    4. Palpitations  -     Holter Monitor - 72 Hour Up To 15 Days; Future  -     Comprehensive Metabolic Panel  -     Magnesium  -     Vitamin B12  -     Folate    5. Screening for thyroid disorder  -     Cancel: TSH Rfx On Abnormal To Free T4  -     TSH  -     T4, free    6. Screening for lipid disorders  -     Lipid Panel    7. Screening, anemia, deficiency, iron  -     CBC & Differential    8. Other acne  -     clindamycin (Clindamycin 1% external gel) 1 % gel; Apply 1 Application topically to the appropriate area as directed 2 (Two) Times a Day.  Dispense: 30 g; Refill: 1    Here for annual exam, fasting labs ordered as above, immunizations up-to-date, colon cancer screening not indicated at this time, GYN health up-to-date with her gynecologist, has an appointment in November, cardiovascular screening see below, counseled patient to increase vegetable intake, water and 150 minutes of exercise weekly combining weightbearing exercises and aerobic activity.    Episodes of tachycardia, it sounds like SVT especially with the subsequent fatigue that we will into, plan to get Holter monitor as above, continue with propranolol as needed.  Follow-up once resulted    Acne that looks like rosacea with a possible superimposed staph infection, will start with treating with clindamycin if not responding to this possibly add hydrocortisone or Retin-A on top, if not improving with these measures refer to Derm.       Follow Up   Return if symptoms worsen or fail to improve, for Recheck - fasting labs now when available, 3 months for acne, tachycardia and anxiety .  Patient was given instructions and counseling regarding her condition or for health maintenance advice. Please see specific information pulled into the AVS if appropriate.

## 2024-10-04 LAB
25(OH)D3+25(OH)D2 SERPL-MCNC: 22.9 NG/ML (ref 30–100)
ALBUMIN SERPL-MCNC: 4.4 G/DL (ref 3.5–5.2)
ALBUMIN/GLOB SERPL: 2.1 G/DL
ALP SERPL-CCNC: 58 U/L (ref 39–117)
ALT SERPL-CCNC: 19 U/L (ref 1–33)
AST SERPL-CCNC: 20 U/L (ref 1–32)
BASOPHILS # BLD AUTO: 0.04 10*3/MM3 (ref 0–0.2)
BASOPHILS NFR BLD AUTO: 0.7 % (ref 0–1.5)
BILIRUB SERPL-MCNC: 0.3 MG/DL (ref 0–1.2)
BUN SERPL-MCNC: 9 MG/DL (ref 6–20)
BUN/CREAT SERPL: 11.4 (ref 7–25)
CALCIUM SERPL-MCNC: 9 MG/DL (ref 8.6–10.5)
CHLORIDE SERPL-SCNC: 108 MMOL/L (ref 98–107)
CHOLEST SERPL-MCNC: 132 MG/DL (ref 0–200)
CO2 SERPL-SCNC: 23.3 MMOL/L (ref 22–29)
CREAT SERPL-MCNC: 0.79 MG/DL (ref 0.57–1)
EGFRCR SERPLBLD CKD-EPI 2021: 103.3 ML/MIN/1.73
EOSINOPHIL # BLD AUTO: 0.29 10*3/MM3 (ref 0–0.4)
EOSINOPHIL NFR BLD AUTO: 5.1 % (ref 0.3–6.2)
ERYTHROCYTE [DISTWIDTH] IN BLOOD BY AUTOMATED COUNT: 11.5 % (ref 12.3–15.4)
FOLATE SERPL-MCNC: 10.3 NG/ML (ref 4.78–24.2)
GLOBULIN SER CALC-MCNC: 2.1 GM/DL
GLUCOSE SERPL-MCNC: 87 MG/DL (ref 65–99)
HCT VFR BLD AUTO: 34.7 % (ref 34–46.6)
HDLC SERPL-MCNC: 44 MG/DL (ref 40–60)
HGB BLD-MCNC: 11.5 G/DL (ref 12–15.9)
IMM GRANULOCYTES # BLD AUTO: 0.01 10*3/MM3 (ref 0–0.05)
IMM GRANULOCYTES NFR BLD AUTO: 0.2 % (ref 0–0.5)
LDLC SERPL CALC-MCNC: 75 MG/DL (ref 0–100)
LYMPHOCYTES # BLD AUTO: 2.07 10*3/MM3 (ref 0.7–3.1)
LYMPHOCYTES NFR BLD AUTO: 36.4 % (ref 19.6–45.3)
MAGNESIUM SERPL-MCNC: 2 MG/DL (ref 1.6–2.6)
MCH RBC QN AUTO: 29.3 PG (ref 26.6–33)
MCHC RBC AUTO-ENTMCNC: 33.1 G/DL (ref 31.5–35.7)
MCV RBC AUTO: 88.3 FL (ref 79–97)
MONOCYTES # BLD AUTO: 0.41 10*3/MM3 (ref 0.1–0.9)
MONOCYTES NFR BLD AUTO: 7.2 % (ref 5–12)
NEUTROPHILS # BLD AUTO: 2.86 10*3/MM3 (ref 1.7–7)
NEUTROPHILS NFR BLD AUTO: 50.4 % (ref 42.7–76)
NRBC BLD AUTO-RTO: 0 /100 WBC (ref 0–0.2)
PLATELET # BLD AUTO: 347 10*3/MM3 (ref 140–450)
POTASSIUM SERPL-SCNC: 4.2 MMOL/L (ref 3.5–5.2)
PROT SERPL-MCNC: 6.5 G/DL (ref 6–8.5)
RBC # BLD AUTO: 3.93 10*6/MM3 (ref 3.77–5.28)
SODIUM SERPL-SCNC: 140 MMOL/L (ref 136–145)
T4 FREE SERPL-MCNC: 1.21 NG/DL (ref 0.92–1.68)
TRIGL SERPL-MCNC: 59 MG/DL (ref 0–150)
TSH SERPL DL<=0.005 MIU/L-ACNC: 1.83 UIU/ML (ref 0.27–4.2)
VIT B12 SERPL-MCNC: 418 PG/ML (ref 211–946)
VLDLC SERPL CALC-MCNC: 13 MG/DL (ref 5–40)
WBC # BLD AUTO: 5.68 10*3/MM3 (ref 3.4–10.8)

## 2024-10-18 ENCOUNTER — HOSPITAL ENCOUNTER (OUTPATIENT)
Dept: CARDIOLOGY | Facility: HOSPITAL | Age: 29
Discharge: HOME | End: 2024-10-18
Admitting: FAMILY MEDICINE
Payer: COMMERCIAL

## 2024-10-18 DIAGNOSIS — R00.2 PALPITATIONS: ICD-10-CM

## 2024-10-18 DIAGNOSIS — R00.0 TACHYCARDIA: ICD-10-CM

## 2024-10-18 PROCEDURE — 93242 EXT ECG>48HR<7D RECORDING: CPT

## 2024-10-30 LAB
CV ZIO BASELINE AVG BPM: 77 BPM
CV ZIO BASELINE BPM HIGH: 155 BPM
CV ZIO BASELINE BPM LOW: 49 BPM
CV ZIO DEVICE ANALYSIS TIME: NORMAL
CV ZIO ECT SVE COUNT: 71 EPISODES
CV ZIO ECT SVE CPLT COUNT: 0 EPISODES
CV ZIO ECT SVE CPLT FREQ: 0
CV ZIO ECT SVE FREQ: NORMAL
CV ZIO ECT SVE TPLT COUNT: 0 EPISODES
CV ZIO ECT SVE TPLT FREQ: 0
CV ZIO ECT VE COUNT: 209 EPISODES
CV ZIO ECT VE CPLT COUNT: 14 EPISODES
CV ZIO ECT VE CPLT FREQ: NORMAL
CV ZIO ECT VE FREQ: NORMAL
CV ZIO ECT VE TPLT COUNT: 0 EPISODES
CV ZIO ECT VE TPLT FREQ: 0
CV ZIO ECTOPIC SVE COUPLET RAW PERCENT: 0 %
CV ZIO ECTOPIC SVE ISOLATED PERCENT: 0.01 %
CV ZIO ECTOPIC SVE TRIPLET RAW PERCENT: 0 %
CV ZIO ECTOPIC VE COUPLET RAW PERCENT: 0 %
CV ZIO ECTOPIC VE ISOLATED PERCENT: 0.03 %
CV ZIO ECTOPIC VE TRIPLET RAW PERCENT: 0 %
CV ZIO ENROLLMENT END: NORMAL
CV ZIO ENROLLMENT START: NORMAL
CV ZIO PATIENT EVENTS DIARIES: 3
CV ZIO PATIENT EVENTS TRIGGERS: 3
CV ZIO PAUSE COUNT: 0
CV ZIO PRESCRIPTION STATUS: NORMAL
CV ZIO SVT COUNT: 0
CV ZIO TOTAL  ENROLLMENT PERIOD: NORMAL
CV ZIO VT COUNT: 0

## 2025-01-28 ENCOUNTER — PATIENT MESSAGE (OUTPATIENT)
Dept: FAMILY MEDICINE CLINIC | Facility: CLINIC | Age: 30
End: 2025-01-28
Payer: COMMERCIAL

## 2025-01-28 DIAGNOSIS — Z11.1 SCREENING EXAMINATION FOR PULMONARY TUBERCULOSIS: Primary | ICD-10-CM

## 2025-02-12 LAB
GAMMA INTERFERON BACKGROUND BLD IA-ACNC: 0.01 IU/ML
M TB IFN-G BLD-IMP: NEGATIVE
M TB IFN-G CD4+ BCKGRND COR BLD-ACNC: 0.03 IU/ML
M TB IFN-G CD4+CD8+ BCKGRND COR BLD-ACNC: 0.02 IU/ML
MITOGEN IGNF BCKGRD COR BLD-ACNC: >10 IU/ML
QUANTIFERON INCUBATION: NORMAL
SERVICE CMNT-IMP: NORMAL

## 2025-05-02 ENCOUNTER — OFFICE VISIT (OUTPATIENT)
Dept: FAMILY MEDICINE CLINIC | Facility: CLINIC | Age: 30
End: 2025-05-02
Payer: COMMERCIAL

## 2025-05-02 VITALS
BODY MASS INDEX: 23.07 KG/M2 | HEART RATE: 84 BPM | TEMPERATURE: 97.6 F | HEIGHT: 67 IN | WEIGHT: 147 LBS | DIASTOLIC BLOOD PRESSURE: 80 MMHG | SYSTOLIC BLOOD PRESSURE: 122 MMHG | OXYGEN SATURATION: 100 %

## 2025-05-02 DIAGNOSIS — F41.1 GAD (GENERALIZED ANXIETY DISORDER): Primary | ICD-10-CM

## 2025-05-02 DIAGNOSIS — Z13.0 SCREENING, ANEMIA, DEFICIENCY, IRON: ICD-10-CM

## 2025-05-02 DIAGNOSIS — Z13.1 SCREENING FOR DIABETES MELLITUS: ICD-10-CM

## 2025-05-02 DIAGNOSIS — Z13.29 SCREENING FOR THYROID DISORDER: ICD-10-CM

## 2025-05-02 DIAGNOSIS — Z13.220 SCREENING FOR LIPID DISORDERS: ICD-10-CM

## 2025-05-02 PROCEDURE — 99214 OFFICE O/P EST MOD 30 MIN: CPT | Performed by: FAMILY MEDICINE

## 2025-05-02 RX ORDER — BUPROPION HYDROCHLORIDE 150 MG/1
150 TABLET ORAL DAILY
Qty: 90 TABLET | Refills: 1 | Status: SHIPPED | OUTPATIENT
Start: 2025-05-02

## 2025-05-02 NOTE — PROGRESS NOTES
"Chief Complaint  Anxiety (Follow up not longer taking Wellbutrin  but want to restart was doing well for a while but feels need the med again )    Subjective        Marie Sheridan presents to [facility]  History of Present Illness    History of Present Illness  The patient presents for evaluation of depression.    Depression and Related Symptoms  She discontinued Wellbutrin in December 2024 after 9-10 months of stability to expand her family and avoid pharmacological interventions. Initially, she experienced no adverse effects. Over the past 1-2 months, she has observed a decline in mental health, increased depressive symptoms, 3 panic attacks, fatigue, muscle pain, joint pain, and nausea since Easter. She is considering resuming medication and is 80% sure she wants to try to get pregnant. She has communicated with her obstetrician regarding medication use during pregnancy.  - Onset: Decline in mental health over the past 1-2 months; nausea since Easter.  - Duration: Symptoms have persisted for 1-2 months.  - Character: Increased depressive symptoms, 3 panic attacks, fatigue, muscle pain, joint pain, and nausea.  - Alleviating/Aggravating Factors: Discontinued Wellbutrin in December 2024.  - Severity: Significant enough to consider resuming medication.    MEDICATIONS  Discontinued: Wellbutrin       Objective   Vital Signs:  /80   Pulse 84   Temp 97.6 °F (36.4 °C)   Ht 170.2 cm (67\")   Wt 66.7 kg (147 lb)   SpO2 100%   BMI 23.02 kg/m²   Estimated body mass index is 23.02 kg/m² as calculated from the following:    Height as of this encounter: 170.2 cm (67\").    Weight as of this encounter: 66.7 kg (147 lb).    BMI is within normal parameters. No other follow-up for BMI required.      Physical Exam  Vitals and nursing note reviewed.   Constitutional:       General: She is not in acute distress.     Appearance: She is well-developed.   HENT:      Head: Normocephalic.      Nose: " Nose normal.   Eyes:      General: No scleral icterus.  Pulmonary:      Effort: Pulmonary effort is normal. No respiratory distress.   Musculoskeletal:         General: Normal range of motion.   Skin:     General: Skin is warm and dry.      Findings: No rash.   Neurological:      Mental Status: She is alert and oriented to person, place, and time.   Psychiatric:         Behavior: Behavior normal.         Thought Content: Thought content normal.         Judgment: Judgment normal.            Result Review :                  Assessment and Plan   Diagnoses and all orders for this visit:    1. TONY (generalized anxiety disorder) (Primary)  -     buPROPion XL (WELLBUTRIN XL) 150 MG 24 hr tablet; Take 1 tablet by mouth Daily.  Dispense: 90 tablet; Refill: 1    2. Screening for diabetes mellitus  -     Comprehensive Metabolic Panel; Future    3. Screening for lipid disorders  -     Lipid Panel; Future    4. Screening for thyroid disorder  -     TSH Rfx On Abnormal To Free T4; Future    5. Screening, anemia, deficiency, iron  -     CBC & Differential; Future           Assessment & Plan  1. Depression, chronic problem not well-controlled.  - Discontinued Wellbutrin in December 2024 after 9-10 months of stability to expand family and avoid pharmacological interventions.  Initially did well without medications however she has had a gradual decline over the past couple of months.  Noticed recent increased depressive symptoms, 3 panic attacks, fatigue, muscle pain, joint pain, and nausea since Easter  - Considering resuming medication, 80% sure wants to try to get pregnant  - Communicated with obstetrician regarding medication use during pregnancy  - Discussed potential use of Wellbutrin and Zoloft during pregnancy  - Advised to continue counseling and consider EMDR therapy  - Provided prescription for Wellbutrin (90-day supply with 6-month refills)  - Fasting labs before physical exam in May 2025    Follow-up  - Follow up in 1  month      Follow Up   Return in about 4 weeks (around 5/30/2025), or if symptoms worsen or fail to improve, for Annual Physical with fasting labs prior.  Patient was given instructions and counseling regarding her condition or for health maintenance advice. Please see specific information pulled into the AVS if appropriate.         Hazel Rubin MD      Patient or patient representative verbalized consent for the use of Ambient Listening during the visit with  Hazel Rubin MD for chart documentation. 5/2/2025  18:00 EDT

## 2025-05-30 ENCOUNTER — OFFICE VISIT (OUTPATIENT)
Dept: FAMILY MEDICINE CLINIC | Facility: CLINIC | Age: 30
End: 2025-05-30
Payer: COMMERCIAL

## 2025-05-30 VITALS
BODY MASS INDEX: 22.6 KG/M2 | OXYGEN SATURATION: 100 % | DIASTOLIC BLOOD PRESSURE: 80 MMHG | HEIGHT: 67 IN | HEART RATE: 101 BPM | WEIGHT: 144 LBS | SYSTOLIC BLOOD PRESSURE: 122 MMHG | TEMPERATURE: 97.7 F

## 2025-05-30 DIAGNOSIS — M77.01 MEDIAL EPICONDYLITIS OF RIGHT ELBOW: ICD-10-CM

## 2025-05-30 DIAGNOSIS — Z00.00 HEALTH MAINTENANCE EXAMINATION: Primary | ICD-10-CM

## 2025-05-30 NOTE — PROGRESS NOTES
"Chief Complaint  Annual Exam (Doing well no complains )    Subjective        Marie Sheridan presents to [facility]  History of Present Illness    History of Present Illness  The patient presents for an annual exam and evaluation of right wrist pain.    General Health and Menstrual Cycle  Resumed Wellbutrin without adverse effects, reports slight decrease in appetite. Menstrual cycles generally regular, minor irregularities with medication changes, increased menstrual flow not concerning. No respiratory or cardiac issues. Maintains healthy diet, adequate protein, water, and electrolytes. No dizziness. History of IBS, under ophthalmologist care, current with Pap smear screenings. No skin abnormalities. Attempting to conceive.    Right Wrist Pain  Severe right wrist pain for past few months, exacerbated by repetitive carpet cleaning due to children's stomach bug. Pain with swelling, grinding, and popping, interferes with daily activities. History of arm fracture unrelated to current wrist pain.  - Onset: Past few months.  - Location: Right wrist.  - Character: Severe pain with swelling, grinding, and popping.  - Alleviating/Aggravating Factors: Exacerbated by repetitive carpet cleaning.  - Severity: Interferes with daily activities.    FAMILY HISTORY  - Mother has arthritis and osteoporosis    MEDICATIONS  Wellbutrin    IMMUNIZATIONS  She is up to date with her tetanus shot, last received in 2022.       Objective   Vital Signs:  /80   Pulse 101   Temp 97.7 °F (36.5 °C)   Ht 170.2 cm (67\")   Wt 65.3 kg (144 lb)   SpO2 100%   BMI 22.55 kg/m²   Estimated body mass index is 22.55 kg/m² as calculated from the following:    Height as of this encounter: 170.2 cm (67\").    Weight as of this encounter: 65.3 kg (144 lb).    BMI is within normal parameters. No other follow-up for BMI required.      Physical Exam  Vitals and nursing note reviewed.   Constitutional:       General: She is not " in acute distress.     Appearance: Normal appearance. She is well-developed.   HENT:      Head: Normocephalic.      Right Ear: Tympanic membrane and ear canal normal. There is no impacted cerumen.      Left Ear: Tympanic membrane and ear canal normal. There is no impacted cerumen.      Nose: Nose normal.   Eyes:      Conjunctiva/sclera: Conjunctivae normal.      Pupils: Pupils are equal, round, and reactive to light.   Neck:      Vascular: No carotid bruit.   Cardiovascular:      Rate and Rhythm: Normal rate and regular rhythm.      Heart sounds: Normal heart sounds. No murmur heard.  Pulmonary:      Effort: Pulmonary effort is normal. No respiratory distress.      Breath sounds: Normal breath sounds.   Abdominal:      General: Abdomen is flat. Bowel sounds are normal. There is no distension.      Tenderness: There is no abdominal tenderness.   Musculoskeletal:         General: Normal range of motion.      Comments: Tenderness on the right wrist lateral to the pisiform extending proximally towards the medial epicondyle, neurovascularly intact   Skin:     General: Skin is warm and dry.      Findings: No rash.   Neurological:      Mental Status: She is alert and oriented to person, place, and time.   Psychiatric:         Behavior: Behavior normal.         Thought Content: Thought content normal.         Judgment: Judgment normal.            Result Review :  The following data was reviewed by: Hazel Rubin MD on 05/30/2025:                Assessment and Plan   Diagnoses and all orders for this visit:    1. Health maintenance examination (Primary)    2. Medial epicondylitis of right elbow    Health maintenance  - Up to date with Pap smear (06/06/2023) and tetanus shot (2022)  - Labs performed today  - Gynecologic health up-to-date no concerns  - Advised patient to continue with regular exercise,      Depression well-controlled no concerns, continue Wellbutrin 150 mg daily       Assessment & Plan  1. Medial  epicondylitis  - Wrist pain started months ago, worsened after repetitive cleaning tasks  - Tenderness and swelling consistent with medial epicondylitis  - Advised specific exercises: tennis ball massage, rubber band stretching,  exercises  - Recommend heat application and massage over ice  - Use OTC analgesics like ibuprofen or Aleve  - Provide exercise handout  - Consider physical therapy referral if no improvement after one month        Follow Up   Return in about 1 year (around 5/30/2026), or if symptoms worsen or fail to improve, for Annual Physical with fasting labs prior.  Patient was given instructions and counseling regarding her condition or for health maintenance advice. Please see specific information pulled into the AVS if appropriate.         Hazel Rubin MD      Patient or patient representative verbalized consent for the use of Ambient Listening during the visit with  Hazel Rubin MD for chart documentation. 5/30/2025  16:10 EDT

## 2025-07-17 ENCOUNTER — PATIENT MESSAGE (OUTPATIENT)
Dept: FAMILY MEDICINE CLINIC | Facility: CLINIC | Age: 30
End: 2025-07-17
Payer: COMMERCIAL

## 2025-07-17 DIAGNOSIS — L71.9 ROSACEA: Primary | ICD-10-CM

## 2025-07-19 ENCOUNTER — DOCUMENTATION (OUTPATIENT)
Dept: FAMILY MEDICINE CLINIC | Facility: CLINIC | Age: 30
End: 2025-07-19
Payer: COMMERCIAL

## 2025-07-19 RX ORDER — ONDANSETRON 4 MG/1
4 TABLET, ORALLY DISINTEGRATING ORAL EVERY 8 HOURS PRN
Qty: 12 TABLET | Refills: 2 | Status: SHIPPED | OUTPATIENT
Start: 2025-07-19

## 2025-07-21 NOTE — PROGRESS NOTES
Patient called on Saturday with substantial diarrhea, GI losses some nausea.  9 weeks pregnant, discussed potentially going to ER if she gets more dehydrated, okay to use Zofran as needed if she has vomiting.  Prescription sent in.

## 2025-08-21 DIAGNOSIS — L71.9 ROSACEA: ICD-10-CM

## 2025-08-21 DIAGNOSIS — L70.8 OTHER ACNE: ICD-10-CM

## 2025-08-21 RX ORDER — CLINDAMYCIN PHOSPHATE 1 G/10ML
1 GEL TOPICAL DAILY
Qty: 75 ML | Refills: 3 | Status: SHIPPED | OUTPATIENT
Start: 2025-08-21